# Patient Record
Sex: FEMALE | Race: WHITE | Employment: OTHER | URBAN - METROPOLITAN AREA
[De-identification: names, ages, dates, MRNs, and addresses within clinical notes are randomized per-mention and may not be internally consistent; named-entity substitution may affect disease eponyms.]

---

## 2017-03-07 ENCOUNTER — OFFICE VISIT (OUTPATIENT)
Dept: FAMILY MEDICINE CLINIC | Age: 72
End: 2017-03-07

## 2017-03-07 VITALS
SYSTOLIC BLOOD PRESSURE: 124 MMHG | BODY MASS INDEX: 33.13 KG/M2 | DIASTOLIC BLOOD PRESSURE: 80 MMHG | TEMPERATURE: 98.6 F | HEART RATE: 79 BPM | WEIGHT: 180 LBS | OXYGEN SATURATION: 96 % | RESPIRATION RATE: 16 BRPM | HEIGHT: 62 IN

## 2017-03-07 DIAGNOSIS — E78.2 MIXED HYPERLIPIDEMIA: ICD-10-CM

## 2017-03-07 DIAGNOSIS — R73.9 HYPERGLYCEMIA: Primary | ICD-10-CM

## 2017-03-07 DIAGNOSIS — E55.9 VITAMIN D DEFICIENCY: ICD-10-CM

## 2017-03-07 DIAGNOSIS — I10 ESSENTIAL HYPERTENSION: ICD-10-CM

## 2017-03-07 LAB
A/G RATIO: 1.6 (ref 1.1–2.2)
ALBUMIN SERPL-MCNC: 4.7 G/DL (ref 3.4–5)
ALP BLD-CCNC: 100 U/L (ref 40–129)
ALT SERPL-CCNC: 22 U/L (ref 10–40)
ANION GAP SERPL CALCULATED.3IONS-SCNC: 18 MMOL/L (ref 3–16)
AST SERPL-CCNC: 22 U/L (ref 15–37)
BILIRUB SERPL-MCNC: <0.2 MG/DL (ref 0–1)
BUN BLDV-MCNC: 14 MG/DL (ref 7–20)
CALCIUM SERPL-MCNC: 10.2 MG/DL (ref 8.3–10.6)
CHLORIDE BLD-SCNC: 102 MMOL/L (ref 99–110)
CHOLESTEROL, TOTAL: 177 MG/DL (ref 0–199)
CO2: 23 MMOL/L (ref 21–32)
CREAT SERPL-MCNC: 0.8 MG/DL (ref 0.6–1.2)
GFR AFRICAN AMERICAN: >60
GFR NON-AFRICAN AMERICAN: >60
GLOBULIN: 3 G/DL
GLUCOSE BLD-MCNC: 114 MG/DL (ref 70–99)
HBA1C MFR BLD: 6.4 %
HDLC SERPL-MCNC: 50 MG/DL (ref 40–60)
LDL CHOLESTEROL CALCULATED: 97 MG/DL
POTASSIUM SERPL-SCNC: 4.8 MMOL/L (ref 3.5–5.1)
SODIUM BLD-SCNC: 143 MMOL/L (ref 136–145)
TOTAL PROTEIN: 7.7 G/DL (ref 6.4–8.2)
TRIGL SERPL-MCNC: 148 MG/DL (ref 0–150)
VITAMIN D 25-HYDROXY: 38.1 NG/ML
VLDLC SERPL CALC-MCNC: 30 MG/DL

## 2017-03-07 PROCEDURE — 36415 COLL VENOUS BLD VENIPUNCTURE: CPT | Performed by: FAMILY MEDICINE

## 2017-03-07 PROCEDURE — 3014F SCREEN MAMMO DOC REV: CPT | Performed by: FAMILY MEDICINE

## 2017-03-07 PROCEDURE — G8419 CALC BMI OUT NRM PARAM NOF/U: HCPCS | Performed by: FAMILY MEDICINE

## 2017-03-07 PROCEDURE — 83036 HEMOGLOBIN GLYCOSYLATED A1C: CPT | Performed by: FAMILY MEDICINE

## 2017-03-07 PROCEDURE — G8399 PT W/DXA RESULTS DOCUMENT: HCPCS | Performed by: FAMILY MEDICINE

## 2017-03-07 PROCEDURE — 4040F PNEUMOC VAC/ADMIN/RCVD: CPT | Performed by: FAMILY MEDICINE

## 2017-03-07 PROCEDURE — G8484 FLU IMMUNIZE NO ADMIN: HCPCS | Performed by: FAMILY MEDICINE

## 2017-03-07 PROCEDURE — 1090F PRES/ABSN URINE INCON ASSESS: CPT | Performed by: FAMILY MEDICINE

## 2017-03-07 PROCEDURE — G8427 DOCREV CUR MEDS BY ELIG CLIN: HCPCS | Performed by: FAMILY MEDICINE

## 2017-03-07 PROCEDURE — 1123F ACP DISCUSS/DSCN MKR DOCD: CPT | Performed by: FAMILY MEDICINE

## 2017-03-07 PROCEDURE — 1036F TOBACCO NON-USER: CPT | Performed by: FAMILY MEDICINE

## 2017-03-07 PROCEDURE — 99214 OFFICE O/P EST MOD 30 MIN: CPT | Performed by: FAMILY MEDICINE

## 2017-03-07 PROCEDURE — 3017F COLORECTAL CA SCREEN DOC REV: CPT | Performed by: FAMILY MEDICINE

## 2017-03-07 RX ORDER — METOPROLOL TARTRATE 50 MG/1
TABLET, FILM COATED ORAL
Qty: 180 TABLET | Refills: 1 | Status: CANCELLED | OUTPATIENT
Start: 2017-03-07

## 2017-03-07 RX ORDER — PRAVASTATIN SODIUM 40 MG
TABLET ORAL
Qty: 90 TABLET | Refills: 1 | Status: CANCELLED | OUTPATIENT
Start: 2017-03-07

## 2017-03-07 RX ORDER — PRAVASTATIN SODIUM 40 MG
TABLET ORAL
Qty: 90 TABLET | Refills: 1 | Status: SHIPPED | OUTPATIENT
Start: 2017-03-07 | End: 2017-09-03 | Stop reason: SDUPTHER

## 2017-03-07 RX ORDER — METOPROLOL TARTRATE 50 MG/1
TABLET, FILM COATED ORAL
Qty: 180 TABLET | Refills: 1 | Status: SHIPPED | OUTPATIENT
Start: 2017-03-07 | End: 2017-09-03 | Stop reason: SDUPTHER

## 2017-03-07 ASSESSMENT — ENCOUNTER SYMPTOMS
COUGH: 1
GASTROINTESTINAL NEGATIVE: 1
EYES NEGATIVE: 1

## 2017-09-03 DIAGNOSIS — E78.2 MIXED HYPERLIPIDEMIA: ICD-10-CM

## 2017-09-03 DIAGNOSIS — I10 ESSENTIAL HYPERTENSION: ICD-10-CM

## 2017-09-05 RX ORDER — METOPROLOL TARTRATE 50 MG/1
TABLET, FILM COATED ORAL
Qty: 180 TABLET | Refills: 1 | Status: SHIPPED | OUTPATIENT
Start: 2017-09-05 | End: 2017-09-28 | Stop reason: SDUPTHER

## 2017-09-05 RX ORDER — PRAVASTATIN SODIUM 40 MG
TABLET ORAL
Qty: 90 TABLET | Refills: 1 | Status: SHIPPED | OUTPATIENT
Start: 2017-09-05 | End: 2017-09-28 | Stop reason: SDUPTHER

## 2017-09-28 ENCOUNTER — OFFICE VISIT (OUTPATIENT)
Dept: FAMILY MEDICINE CLINIC | Age: 72
End: 2017-09-28

## 2017-09-28 VITALS
SYSTOLIC BLOOD PRESSURE: 160 MMHG | WEIGHT: 179 LBS | OXYGEN SATURATION: 98 % | HEART RATE: 79 BPM | HEIGHT: 62 IN | RESPIRATION RATE: 16 BRPM | DIASTOLIC BLOOD PRESSURE: 90 MMHG | TEMPERATURE: 98.4 F | BODY MASS INDEX: 32.94 KG/M2

## 2017-09-28 DIAGNOSIS — I10 ESSENTIAL HYPERTENSION: ICD-10-CM

## 2017-09-28 DIAGNOSIS — E78.2 MIXED HYPERLIPIDEMIA: ICD-10-CM

## 2017-09-28 DIAGNOSIS — E55.9 VITAMIN D DEFICIENCY: ICD-10-CM

## 2017-09-28 DIAGNOSIS — G50.0 TRIGEMINAL NEURALGIA: Primary | ICD-10-CM

## 2017-09-28 LAB
A/G RATIO: 1.6 (ref 1.1–2.2)
ALBUMIN SERPL-MCNC: 4.7 G/DL (ref 3.4–5)
ALP BLD-CCNC: 84 U/L (ref 40–129)
ALT SERPL-CCNC: 22 U/L (ref 10–40)
ANION GAP SERPL CALCULATED.3IONS-SCNC: 13 MMOL/L (ref 3–16)
AST SERPL-CCNC: 19 U/L (ref 15–37)
BILIRUB SERPL-MCNC: 0.3 MG/DL (ref 0–1)
BUN BLDV-MCNC: 19 MG/DL (ref 7–20)
CALCIUM SERPL-MCNC: 9.8 MG/DL (ref 8.3–10.6)
CHLORIDE BLD-SCNC: 103 MMOL/L (ref 99–110)
CHOLESTEROL, TOTAL: 173 MG/DL (ref 0–199)
CO2: 26 MMOL/L (ref 21–32)
CREAT SERPL-MCNC: 0.8 MG/DL (ref 0.6–1.2)
GFR AFRICAN AMERICAN: >60
GFR NON-AFRICAN AMERICAN: >60
GLOBULIN: 3 G/DL
GLUCOSE BLD-MCNC: 104 MG/DL (ref 70–99)
HDLC SERPL-MCNC: 45 MG/DL (ref 40–60)
LDL CHOLESTEROL CALCULATED: 92 MG/DL
POTASSIUM SERPL-SCNC: 4.8 MMOL/L (ref 3.5–5.1)
SODIUM BLD-SCNC: 142 MMOL/L (ref 136–145)
TOTAL PROTEIN: 7.7 G/DL (ref 6.4–8.2)
TRIGL SERPL-MCNC: 178 MG/DL (ref 0–150)
VITAMIN D 25-HYDROXY: 38.4 NG/ML
VLDLC SERPL CALC-MCNC: 36 MG/DL

## 2017-09-28 PROCEDURE — 3014F SCREEN MAMMO DOC REV: CPT | Performed by: FAMILY MEDICINE

## 2017-09-28 PROCEDURE — 1090F PRES/ABSN URINE INCON ASSESS: CPT | Performed by: FAMILY MEDICINE

## 2017-09-28 PROCEDURE — 3017F COLORECTAL CA SCREEN DOC REV: CPT | Performed by: FAMILY MEDICINE

## 2017-09-28 PROCEDURE — 1036F TOBACCO NON-USER: CPT | Performed by: FAMILY MEDICINE

## 2017-09-28 PROCEDURE — G8427 DOCREV CUR MEDS BY ELIG CLIN: HCPCS | Performed by: FAMILY MEDICINE

## 2017-09-28 PROCEDURE — 36415 COLL VENOUS BLD VENIPUNCTURE: CPT | Performed by: FAMILY MEDICINE

## 2017-09-28 PROCEDURE — 1123F ACP DISCUSS/DSCN MKR DOCD: CPT | Performed by: FAMILY MEDICINE

## 2017-09-28 PROCEDURE — 4040F PNEUMOC VAC/ADMIN/RCVD: CPT | Performed by: FAMILY MEDICINE

## 2017-09-28 PROCEDURE — G8399 PT W/DXA RESULTS DOCUMENT: HCPCS | Performed by: FAMILY MEDICINE

## 2017-09-28 PROCEDURE — 99214 OFFICE O/P EST MOD 30 MIN: CPT | Performed by: FAMILY MEDICINE

## 2017-09-28 PROCEDURE — G8419 CALC BMI OUT NRM PARAM NOF/U: HCPCS | Performed by: FAMILY MEDICINE

## 2017-09-28 RX ORDER — HYDROCHLOROTHIAZIDE 25 MG/1
25 TABLET ORAL DAILY
Qty: 30 TABLET | Refills: 5 | Status: SHIPPED | OUTPATIENT
Start: 2017-09-28 | End: 2018-05-09 | Stop reason: SDUPTHER

## 2017-09-28 RX ORDER — PRAVASTATIN SODIUM 40 MG
TABLET ORAL
Qty: 90 TABLET | Refills: 2 | Status: SHIPPED | OUTPATIENT
Start: 2017-09-28 | End: 2018-05-22 | Stop reason: SDUPTHER

## 2017-09-28 RX ORDER — NAPROXEN SODIUM 220 MG
220 TABLET ORAL 2 TIMES DAILY WITH MEALS
COMMUNITY

## 2017-09-28 RX ORDER — MECLIZINE HYDROCHLORIDE 25 MG/1
25 TABLET ORAL 3 TIMES DAILY PRN
Qty: 90 TABLET | Refills: 2 | Status: SHIPPED | OUTPATIENT
Start: 2017-09-28 | End: 2021-11-22 | Stop reason: SDUPTHER

## 2017-09-28 RX ORDER — ACETAMINOPHEN 650 MG/1
650 SUPPOSITORY RECTAL EVERY 4 HOURS PRN
COMMUNITY

## 2017-09-28 RX ORDER — METOPROLOL TARTRATE 50 MG/1
TABLET, FILM COATED ORAL
Qty: 180 TABLET | Refills: 2 | Status: SHIPPED | OUTPATIENT
Start: 2017-09-28 | End: 2018-05-22 | Stop reason: SDUPTHER

## 2018-05-09 PROBLEM — I16.0 HYPERTENSIVE URGENCY: Chronic | Status: ACTIVE | Noted: 2018-05-09

## 2018-05-09 PROBLEM — I16.0 HYPERTENSIVE URGENCY: Status: ACTIVE | Noted: 2018-05-09

## 2018-05-09 PROBLEM — R42 VERTIGO: Status: ACTIVE | Noted: 2018-05-09

## 2018-05-22 ENCOUNTER — OFFICE VISIT (OUTPATIENT)
Dept: FAMILY MEDICINE CLINIC | Age: 73
End: 2018-05-22

## 2018-05-22 VITALS
DIASTOLIC BLOOD PRESSURE: 92 MMHG | WEIGHT: 186 LBS | HEART RATE: 88 BPM | TEMPERATURE: 98.2 F | BODY MASS INDEX: 34.02 KG/M2 | SYSTOLIC BLOOD PRESSURE: 156 MMHG

## 2018-05-22 DIAGNOSIS — G50.0 TRIGEMINAL NEURALGIA OF LEFT SIDE OF FACE: Primary | ICD-10-CM

## 2018-05-22 DIAGNOSIS — E78.2 MIXED HYPERLIPIDEMIA: ICD-10-CM

## 2018-05-22 DIAGNOSIS — I10 ESSENTIAL HYPERTENSION: ICD-10-CM

## 2018-05-22 DIAGNOSIS — R73.03 PREDIABETES: ICD-10-CM

## 2018-05-22 PROCEDURE — G8417 CALC BMI ABV UP PARAM F/U: HCPCS | Performed by: NURSE PRACTITIONER

## 2018-05-22 PROCEDURE — 99214 OFFICE O/P EST MOD 30 MIN: CPT | Performed by: NURSE PRACTITIONER

## 2018-05-22 PROCEDURE — G8399 PT W/DXA RESULTS DOCUMENT: HCPCS | Performed by: NURSE PRACTITIONER

## 2018-05-22 PROCEDURE — G8427 DOCREV CUR MEDS BY ELIG CLIN: HCPCS | Performed by: NURSE PRACTITIONER

## 2018-05-22 PROCEDURE — 1036F TOBACCO NON-USER: CPT | Performed by: NURSE PRACTITIONER

## 2018-05-22 PROCEDURE — 3017F COLORECTAL CA SCREEN DOC REV: CPT | Performed by: NURSE PRACTITIONER

## 2018-05-22 PROCEDURE — 1123F ACP DISCUSS/DSCN MKR DOCD: CPT | Performed by: NURSE PRACTITIONER

## 2018-05-22 PROCEDURE — 1090F PRES/ABSN URINE INCON ASSESS: CPT | Performed by: NURSE PRACTITIONER

## 2018-05-22 PROCEDURE — 4040F PNEUMOC VAC/ADMIN/RCVD: CPT | Performed by: NURSE PRACTITIONER

## 2018-05-22 RX ORDER — PRAVASTATIN SODIUM 40 MG
TABLET ORAL
Qty: 90 TABLET | Refills: 2 | Status: SHIPPED | OUTPATIENT
Start: 2018-05-22 | End: 2019-03-11 | Stop reason: SDUPTHER

## 2018-05-22 RX ORDER — METOPROLOL TARTRATE 50 MG/1
TABLET, FILM COATED ORAL
Qty: 180 TABLET | Refills: 2 | Status: SHIPPED | OUTPATIENT
Start: 2018-05-22 | End: 2019-03-11 | Stop reason: SDUPTHER

## 2018-05-22 RX ORDER — HYDROCHLOROTHIAZIDE 12.5 MG/1
25 TABLET ORAL DAILY
Qty: 30 TABLET | Refills: 1 | Status: SHIPPED | OUTPATIENT
Start: 2018-05-22 | End: 2018-05-22 | Stop reason: DRUGHIGH

## 2018-08-27 ENCOUNTER — OFFICE VISIT (OUTPATIENT)
Dept: FAMILY MEDICINE CLINIC | Age: 73
End: 2018-08-27

## 2018-08-27 VITALS
BODY MASS INDEX: 33.65 KG/M2 | WEIGHT: 184 LBS | HEART RATE: 67 BPM | DIASTOLIC BLOOD PRESSURE: 80 MMHG | SYSTOLIC BLOOD PRESSURE: 138 MMHG | OXYGEN SATURATION: 97 %

## 2018-08-27 DIAGNOSIS — E78.49 OTHER HYPERLIPIDEMIA: Chronic | ICD-10-CM

## 2018-08-27 DIAGNOSIS — Z12.39 BREAST CANCER SCREENING: ICD-10-CM

## 2018-08-27 DIAGNOSIS — R73.03 PRE-DIABETES: Primary | ICD-10-CM

## 2018-08-27 DIAGNOSIS — I47.1 PAROXYSMAL SUPRAVENTRICULAR TACHYCARDIA (HCC): Chronic | ICD-10-CM

## 2018-08-27 DIAGNOSIS — I10 ESSENTIAL HYPERTENSION: Chronic | ICD-10-CM

## 2018-08-27 DIAGNOSIS — M85.80 OSTEOPENIA, UNSPECIFIED LOCATION: ICD-10-CM

## 2018-08-27 PROCEDURE — 1101F PT FALLS ASSESS-DOCD LE1/YR: CPT | Performed by: FAMILY MEDICINE

## 2018-08-27 PROCEDURE — 1123F ACP DISCUSS/DSCN MKR DOCD: CPT | Performed by: FAMILY MEDICINE

## 2018-08-27 PROCEDURE — 36415 COLL VENOUS BLD VENIPUNCTURE: CPT | Performed by: FAMILY MEDICINE

## 2018-08-27 PROCEDURE — 3017F COLORECTAL CA SCREEN DOC REV: CPT | Performed by: FAMILY MEDICINE

## 2018-08-27 PROCEDURE — 1036F TOBACCO NON-USER: CPT | Performed by: FAMILY MEDICINE

## 2018-08-27 PROCEDURE — 4040F PNEUMOC VAC/ADMIN/RCVD: CPT | Performed by: FAMILY MEDICINE

## 2018-08-27 PROCEDURE — G8427 DOCREV CUR MEDS BY ELIG CLIN: HCPCS | Performed by: FAMILY MEDICINE

## 2018-08-27 PROCEDURE — G8417 CALC BMI ABV UP PARAM F/U: HCPCS | Performed by: FAMILY MEDICINE

## 2018-08-27 PROCEDURE — G8399 PT W/DXA RESULTS DOCUMENT: HCPCS | Performed by: FAMILY MEDICINE

## 2018-08-27 PROCEDURE — 1090F PRES/ABSN URINE INCON ASSESS: CPT | Performed by: FAMILY MEDICINE

## 2018-08-27 PROCEDURE — 99214 OFFICE O/P EST MOD 30 MIN: CPT | Performed by: FAMILY MEDICINE

## 2018-08-27 RX ORDER — ACETAMINOPHEN 325 MG/1
650 TABLET ORAL EVERY 6 HOURS PRN
COMMUNITY
End: 2019-03-11

## 2018-08-27 ASSESSMENT — PATIENT HEALTH QUESTIONNAIRE - PHQ9
SUM OF ALL RESPONSES TO PHQ9 QUESTIONS 1 & 2: 0
SUM OF ALL RESPONSES TO PHQ QUESTIONS 1-9: 0
2. FEELING DOWN, DEPRESSED OR HOPELESS: 0
SUM OF ALL RESPONSES TO PHQ QUESTIONS 1-9: 0
1. LITTLE INTEREST OR PLEASURE IN DOING THINGS: 0

## 2018-08-28 LAB
ESTIMATED AVERAGE GLUCOSE: 134.1 MG/DL
HBA1C MFR BLD: 6.3 %

## 2018-09-09 ASSESSMENT — ENCOUNTER SYMPTOMS: SHORTNESS OF BREATH: 0

## 2019-01-10 ENCOUNTER — HOSPITAL ENCOUNTER (OUTPATIENT)
Dept: WOMENS IMAGING | Age: 74
Discharge: HOME | End: 2019-01-10
Payer: COMMERCIAL

## 2019-01-10 DIAGNOSIS — Z12.31 VISIT FOR SCREENING MAMMOGRAM: ICD-10-CM

## 2019-01-10 PROCEDURE — 77067 SCR MAMMO BI INCL CAD: CPT

## 2019-03-11 ENCOUNTER — OFFICE VISIT (OUTPATIENT)
Dept: FAMILY MEDICINE CLINIC | Age: 74
End: 2019-03-11
Payer: COMMERCIAL

## 2019-03-11 VITALS
SYSTOLIC BLOOD PRESSURE: 130 MMHG | TEMPERATURE: 98.1 F | BODY MASS INDEX: 34.02 KG/M2 | HEART RATE: 72 BPM | WEIGHT: 186 LBS | DIASTOLIC BLOOD PRESSURE: 72 MMHG

## 2019-03-11 DIAGNOSIS — E78.49 OTHER HYPERLIPIDEMIA: ICD-10-CM

## 2019-03-11 DIAGNOSIS — M79.645 PAIN OF LEFT MIDDLE FINGER: ICD-10-CM

## 2019-03-11 DIAGNOSIS — I10 ESSENTIAL HYPERTENSION: ICD-10-CM

## 2019-03-11 DIAGNOSIS — I47.1 PAROXYSMAL SUPRAVENTRICULAR TACHYCARDIA (HCC): ICD-10-CM

## 2019-03-11 DIAGNOSIS — R73.03 PRE-DIABETES: ICD-10-CM

## 2019-03-11 DIAGNOSIS — J44.9 CHRONIC OBSTRUCTIVE PULMONARY DISEASE, UNSPECIFIED COPD TYPE (HCC): ICD-10-CM

## 2019-03-11 DIAGNOSIS — E78.2 MIXED HYPERLIPIDEMIA: Primary | ICD-10-CM

## 2019-03-11 PROCEDURE — 4040F PNEUMOC VAC/ADMIN/RCVD: CPT | Performed by: FAMILY MEDICINE

## 2019-03-11 PROCEDURE — 3023F SPIROM DOC REV: CPT | Performed by: FAMILY MEDICINE

## 2019-03-11 PROCEDURE — 99214 OFFICE O/P EST MOD 30 MIN: CPT | Performed by: FAMILY MEDICINE

## 2019-03-11 PROCEDURE — G8484 FLU IMMUNIZE NO ADMIN: HCPCS | Performed by: FAMILY MEDICINE

## 2019-03-11 PROCEDURE — 3017F COLORECTAL CA SCREEN DOC REV: CPT | Performed by: FAMILY MEDICINE

## 2019-03-11 PROCEDURE — G8417 CALC BMI ABV UP PARAM F/U: HCPCS | Performed by: FAMILY MEDICINE

## 2019-03-11 PROCEDURE — G8399 PT W/DXA RESULTS DOCUMENT: HCPCS | Performed by: FAMILY MEDICINE

## 2019-03-11 PROCEDURE — 1123F ACP DISCUSS/DSCN MKR DOCD: CPT | Performed by: FAMILY MEDICINE

## 2019-03-11 PROCEDURE — 1101F PT FALLS ASSESS-DOCD LE1/YR: CPT | Performed by: FAMILY MEDICINE

## 2019-03-11 PROCEDURE — G8926 SPIRO NO PERF OR DOC: HCPCS | Performed by: FAMILY MEDICINE

## 2019-03-11 PROCEDURE — 1090F PRES/ABSN URINE INCON ASSESS: CPT | Performed by: FAMILY MEDICINE

## 2019-03-11 PROCEDURE — 1036F TOBACCO NON-USER: CPT | Performed by: FAMILY MEDICINE

## 2019-03-11 PROCEDURE — G8427 DOCREV CUR MEDS BY ELIG CLIN: HCPCS | Performed by: FAMILY MEDICINE

## 2019-03-11 RX ORDER — METOPROLOL TARTRATE 50 MG/1
TABLET, FILM COATED ORAL
Qty: 180 TABLET | Refills: 1 | Status: SHIPPED | OUTPATIENT
Start: 2019-03-11 | End: 2019-09-19 | Stop reason: SDUPTHER

## 2019-03-11 RX ORDER — PRAVASTATIN SODIUM 40 MG
TABLET ORAL
Qty: 90 TABLET | Refills: 1 | Status: SHIPPED | OUTPATIENT
Start: 2019-03-11 | End: 2019-09-19 | Stop reason: SDUPTHER

## 2019-03-11 ASSESSMENT — PATIENT HEALTH QUESTIONNAIRE - PHQ9
SUM OF ALL RESPONSES TO PHQ9 QUESTIONS 1 & 2: 0
2. FEELING DOWN, DEPRESSED OR HOPELESS: 0
SUM OF ALL RESPONSES TO PHQ QUESTIONS 1-9: 0
SUM OF ALL RESPONSES TO PHQ QUESTIONS 1-9: 0
1. LITTLE INTEREST OR PLEASURE IN DOING THINGS: 0

## 2019-03-11 ASSESSMENT — ENCOUNTER SYMPTOMS: SHORTNESS OF BREATH: 0

## 2019-03-12 LAB
A/G RATIO: 1.7 (ref 1.1–2.2)
ALBUMIN SERPL-MCNC: 5 G/DL (ref 3.4–5)
ALP BLD-CCNC: 77 U/L (ref 40–129)
ALT SERPL-CCNC: 27 U/L (ref 10–40)
ANION GAP SERPL CALCULATED.3IONS-SCNC: 15 MMOL/L (ref 3–16)
AST SERPL-CCNC: 21 U/L (ref 15–37)
BILIRUB SERPL-MCNC: 0.3 MG/DL (ref 0–1)
BUN BLDV-MCNC: 19 MG/DL (ref 7–20)
CALCIUM SERPL-MCNC: 10.2 MG/DL (ref 8.3–10.6)
CHLORIDE BLD-SCNC: 103 MMOL/L (ref 99–110)
CHOLESTEROL, TOTAL: 152 MG/DL (ref 0–199)
CO2: 24 MMOL/L (ref 21–32)
CREAT SERPL-MCNC: 0.8 MG/DL (ref 0.6–1.2)
ESTIMATED AVERAGE GLUCOSE: 131.2 MG/DL
GFR AFRICAN AMERICAN: >60
GFR NON-AFRICAN AMERICAN: >60
GLOBULIN: 2.9 G/DL
GLUCOSE BLD-MCNC: 109 MG/DL (ref 70–99)
HBA1C MFR BLD: 6.2 %
HDLC SERPL-MCNC: 46 MG/DL (ref 40–60)
LDL CHOLESTEROL CALCULATED: 71 MG/DL
POTASSIUM SERPL-SCNC: 4.8 MMOL/L (ref 3.5–5.1)
SODIUM BLD-SCNC: 142 MMOL/L (ref 136–145)
TOTAL PROTEIN: 7.9 G/DL (ref 6.4–8.2)
TRIGL SERPL-MCNC: 174 MG/DL (ref 0–150)
VLDLC SERPL CALC-MCNC: 35 MG/DL

## 2019-03-18 ENCOUNTER — OFFICE VISIT (OUTPATIENT)
Dept: ORTHOPEDIC SURGERY | Age: 74
End: 2019-03-18
Payer: COMMERCIAL

## 2019-03-18 VITALS — WEIGHT: 186.07 LBS | HEIGHT: 62 IN | RESPIRATION RATE: 11 BRPM | BODY MASS INDEX: 34.24 KG/M2

## 2019-03-18 DIAGNOSIS — M65.332 TRIGGER FINGER, LEFT MIDDLE FINGER: ICD-10-CM

## 2019-03-18 DIAGNOSIS — R20.0 NUMBNESS IN BOTH HANDS: ICD-10-CM

## 2019-03-18 PROCEDURE — 1123F ACP DISCUSS/DSCN MKR DOCD: CPT | Performed by: ORTHOPAEDIC SURGERY

## 2019-03-18 PROCEDURE — G8417 CALC BMI ABV UP PARAM F/U: HCPCS | Performed by: ORTHOPAEDIC SURGERY

## 2019-03-18 PROCEDURE — 4040F PNEUMOC VAC/ADMIN/RCVD: CPT | Performed by: ORTHOPAEDIC SURGERY

## 2019-03-18 PROCEDURE — 3017F COLORECTAL CA SCREEN DOC REV: CPT | Performed by: ORTHOPAEDIC SURGERY

## 2019-03-18 PROCEDURE — 1036F TOBACCO NON-USER: CPT | Performed by: ORTHOPAEDIC SURGERY

## 2019-03-18 PROCEDURE — G8484 FLU IMMUNIZE NO ADMIN: HCPCS | Performed by: ORTHOPAEDIC SURGERY

## 2019-03-18 PROCEDURE — 1090F PRES/ABSN URINE INCON ASSESS: CPT | Performed by: ORTHOPAEDIC SURGERY

## 2019-03-18 PROCEDURE — 1101F PT FALLS ASSESS-DOCD LE1/YR: CPT | Performed by: ORTHOPAEDIC SURGERY

## 2019-03-18 PROCEDURE — 99203 OFFICE O/P NEW LOW 30 MIN: CPT | Performed by: ORTHOPAEDIC SURGERY

## 2019-03-18 PROCEDURE — G8399 PT W/DXA RESULTS DOCUMENT: HCPCS | Performed by: ORTHOPAEDIC SURGERY

## 2019-03-18 PROCEDURE — G8427 DOCREV CUR MEDS BY ELIG CLIN: HCPCS | Performed by: ORTHOPAEDIC SURGERY

## 2019-04-05 ENCOUNTER — OFFICE VISIT (OUTPATIENT)
Dept: ORTHOPEDIC SURGERY | Age: 74
End: 2019-04-05
Payer: COMMERCIAL

## 2019-04-05 DIAGNOSIS — G56.03 BILATERAL CARPAL TUNNEL SYNDROME: Primary | ICD-10-CM

## 2019-04-05 PROCEDURE — 95886 MUSC TEST DONE W/N TEST COMP: CPT | Performed by: PHYSICAL MEDICINE & REHABILITATION

## 2019-04-05 PROCEDURE — 95910 NRV CNDJ TEST 7-8 STUDIES: CPT | Performed by: PHYSICAL MEDICINE & REHABILITATION

## 2019-04-05 PROCEDURE — 99999 PR OFFICE/OUTPT VISIT,PROCEDURE ONLY: CPT | Performed by: PHYSICAL MEDICINE & REHABILITATION

## 2019-04-05 NOTE — PROGRESS NOTES
[address] MEDICINE      Patient: Mariluz Pires Age: 76 Years 0 Months  Sex: Female Date: 4/5/2019  YOB: 1945 Ref. Phys.: Dr George Winkler  Notes:  b/l hand numbness; r/o CTS      Sensory NCS      Nerve / Sites Peak PeakAmp Dist Agustin    ms µV cm m/s   L MEDIAN - D2 ULNAR D5   1. Median Wrist 5.25 6.8 14 33.3   2. Ulnar Wrist 3.30 12.7 14 50.9   R MEDIAN - D2 ULNAR D5   1. Median Wrist NR  14    2. Ulnar Wrist 3.30 21.1 14 53.8       R MEDIAN - D2 ULNAR D5: no right median SNAP      Motor NCS      Nerve / Sites Lat Amp Amp Dist Agustin    ms mV % cm m/s   L MEDIAN - APB   1. Wrist 5.30 8.3 100 8    2. Elbow 9.30 7.8 94.2 22 55.0   R MEDIAN - APB   1. Wrist 6.50 0.1 100     L ULNAR - ADM   1. Wrist 2.75 10.2 100 8    2. B. Elbow 5.85 10.7 105 19 61.3   3. A. Elbow 7.80 10.6 104 10 51.3   R ULNAR - ADM   1. Wrist 3.10 9.2 100 8    2. B. Elbow 6.10 9.9 107 19 63.3   3. A. Elbow 8.10 9.8 106 10 50.0       EMG Summary Table     Spontaneous MUAP Recruit. Ins. Act Fibs. PSW Fasics. H.F. Amp. Dur. Poly's. Pattern   L. FIRST D INTEROSS N None None None None N N N N   L. ABD POLL BREVIS N None None None None ++ + N N   R. ABD POLL BREVIS N None None None None ++ + N -   L. BICEPS N None None None None N N N N   R. BICEPS N None None None None N N N N   L. TRICEPS N None None None None N N N N   R. TRICEPS N None None None None N N N N   L. EXT CARPI R BREV N None None None None N N N N   R. EXT CARPI R BREV N None None None None N N N N   L. EXT DIG COMM N None None None None N N N N   R. EXT DIG COMM N None None None None N N N N   L. PRON TERES N None None None None N N N N   R. PRON TERES N None None None None N N N N       Summary: Right median SNAP is absent. Left median SNAP and bilateral median CMAP latencies are slowed with low left sensory and right motor amplitudes.   Monopolar exam reveals large amplitude increased duration motor units to bilateral APB

## 2019-04-08 ENCOUNTER — OFFICE VISIT (OUTPATIENT)
Dept: ORTHOPEDIC SURGERY | Age: 74
End: 2019-04-08
Payer: COMMERCIAL

## 2019-04-08 VITALS — WEIGHT: 186.07 LBS | HEIGHT: 62 IN | BODY MASS INDEX: 34.24 KG/M2 | RESPIRATION RATE: 17 BRPM

## 2019-04-08 DIAGNOSIS — G56.03 CARPAL TUNNEL SYNDROME ON BOTH SIDES: ICD-10-CM

## 2019-04-08 DIAGNOSIS — M65.332 TRIGGER FINGER, LEFT MIDDLE FINGER: Primary | ICD-10-CM

## 2019-04-08 PROCEDURE — 4040F PNEUMOC VAC/ADMIN/RCVD: CPT | Performed by: ORTHOPAEDIC SURGERY

## 2019-04-08 PROCEDURE — G8417 CALC BMI ABV UP PARAM F/U: HCPCS | Performed by: ORTHOPAEDIC SURGERY

## 2019-04-08 PROCEDURE — 1123F ACP DISCUSS/DSCN MKR DOCD: CPT | Performed by: ORTHOPAEDIC SURGERY

## 2019-04-08 PROCEDURE — 1090F PRES/ABSN URINE INCON ASSESS: CPT | Performed by: ORTHOPAEDIC SURGERY

## 2019-04-08 PROCEDURE — G8427 DOCREV CUR MEDS BY ELIG CLIN: HCPCS | Performed by: ORTHOPAEDIC SURGERY

## 2019-04-08 PROCEDURE — 1036F TOBACCO NON-USER: CPT | Performed by: ORTHOPAEDIC SURGERY

## 2019-04-08 PROCEDURE — G8399 PT W/DXA RESULTS DOCUMENT: HCPCS | Performed by: ORTHOPAEDIC SURGERY

## 2019-04-08 PROCEDURE — 99214 OFFICE O/P EST MOD 30 MIN: CPT | Performed by: ORTHOPAEDIC SURGERY

## 2019-04-08 PROCEDURE — 3017F COLORECTAL CA SCREEN DOC REV: CPT | Performed by: ORTHOPAEDIC SURGERY

## 2019-04-08 NOTE — PROGRESS NOTES
Chief Complaint:  Hand Pain (TR EMG )      History of Present of Illness: The patient returns and did have electrodiagnostic studies performed. It demonstrated fairly advanced bilateral carpal tunnel syndrome with end organ muscle changes into the thenar muscles bilaterally. She continues to have generalized stiffness and triggering to the left long finger. Review of Systems  Pertinent items are noted in HPI  Denies fever, chills, confusion, bowel/bladder active change. Review of systems reviewed from Patient History Form dated on 3/18/2019 and available in the patient's chart under the Media tab. Examination:  On exam today it is more readily apparent that she does have some atrophy along the thenar musculature, especially on the right side. There is grade 2 triggering of the left long finger with generalized stiffness. I do not appreciate any other digits with triggering. She does have at least protective sensation to the digits but there is diminution in the median nerve distribution bilaterally. All fingers have good perfusion. Radiology:     X-rays obtained and reviewed in office:  Views     Location    Impression      No orders of the defined types were placed in this encounter. Impression:  Encounter Diagnoses   Name Primary?  Trigger finger, left middle finger Yes    Carpal tunnel syndrome on both sides          Treatment Plan:  I talked with the patient about the severe and bilateral carpal tunnel syndrome and the left long trigger digit. She is much more bothered on the left side perhaps because of the combination of factors. I have not hesitated in recommending definitive left carpal tunnel release and left long trigger release. We will involve early postop therapy. After she has healed substantially on the left side she does understand she will also then need a right carpal tunnel release.   However, with the staged procedures this will always allow for her to have 1 hand for daily tasks and self-care. We discussed the risks, benefits, limitations, alternatives, and postop recovery following the proposed procedure. We will begin the process of scheduling surgery if there are no other preoperative medical contraindications. Please note that this transcription was created using voice recognition software. Any errors are unintentional and may be due to voice recognition transcription.

## 2019-04-30 NOTE — TELEPHONE ENCOUNTER
Tried calling pt, but phone rings one time and then the line goes dead. Sent pt [employer].

## 2019-05-02 NOTE — TELEPHONE ENCOUNTER
Ellie Bell 1:01 PM   No I am not taking Metformin do not need thank you. Sorry you haven't been able to reach me but I've been having trouble with new phones.

## 2019-09-19 DIAGNOSIS — I10 ESSENTIAL HYPERTENSION: ICD-10-CM

## 2019-09-19 DIAGNOSIS — E78.2 MIXED HYPERLIPIDEMIA: ICD-10-CM

## 2019-09-19 RX ORDER — METOPROLOL TARTRATE 50 MG/1
TABLET, FILM COATED ORAL
Qty: 180 TABLET | Refills: 4 | Status: SHIPPED | OUTPATIENT
Start: 2019-09-19 | End: 2020-11-11 | Stop reason: SDUPTHER

## 2019-09-19 RX ORDER — PRAVASTATIN SODIUM 40 MG
TABLET ORAL
Qty: 90 TABLET | Refills: 4 | Status: SHIPPED | OUTPATIENT
Start: 2019-09-19 | End: 2020-11-11 | Stop reason: SDUPTHER

## 2020-06-30 NOTE — PROGRESS NOTES
Chief Complaint   Patient presents with    New Patient     former [facility] patient         HPI      68 y.o. female presents today to establish care. Previously had dizziness and saw ENT Dr. Eyal Sommer in 2009. Has been taking meclizine which helps with with dizziness. 25mg every 4 hours. Dizzy spell occur twice per month. States that it occurs with position changes and barometric pressure. Hx of trigeminal neuralgia does not want to see neurologitst  Takes 2 aleve 2 days in a row and would be fine. Hasn't taken in at least 2 months. Over the past few years overall her symptoms have been improving. Hx of HLD on pravastatin. Concern about risk for DM related to statin use. Denies any medication SE. Hx of pre diabetes not taking the the metformin. She states it wasn't discussed with her regarding starting it. Hx HTN and SVT. Takes lopressor 50mg BID and HCTZ prn for elevated BP (she was told to take the HCTZ like this by her previous PCP) BP runs 120's 130's at home. BP today 138/80    Patient Active Problem List   Diagnosis    Hypertension    Vitamin D deficiency    HLD (hyperlipidemia)    Paroxysmal supraventricular tachycardia (HCC)    Asthma/COPD    Arthritis    Trigeminal neuralgia    Fatty liver    Hypertensive urgency    Vertigo     Past Medical History:   Diagnosis Date    Arthritis     Asthma     COPD (chronic obstructive pulmonary disease) (HCC)     Fatty liver     Hiatal hernia     HTN (hypertension)     Hyperlipidemia     Osteopenia     Trigeminal neuralgia     Vertigo        Past Surgical History:   Procedure Laterality Date    CHOLECYSTECTOMY      DILATION AND CURETTAGE OF UTERUS      DILATION AND CURETTAGE OF UTERUS      x2    TUBAL LIGATION         There is no immunization history on file for this patient.      Current Outpatient Prescriptions   Medication Sig Dispense Refill    acetaminophen (TYLENOL) 325 MG tablet Take 650 mg by mouth every 6 hours as needed for Pain      pravastatin (PRAVACHOL) 40 MG tablet TAKE 1 TABLET EVERY EVENING 90 tablet 2    metoprolol tartrate (LOPRESSOR) 50 MG tablet TAKE 1 TABLET TWICE A  tablet 2    acetaminophen (TYLENOL) 650 MG suppository Place 650 mg rectally every 4 hours as needed for Fever      naproxen sodium (ALEVE) 220 MG tablet Take 220 mg by mouth 2 times daily (with meals)      meclizine (ANTIVERT) 25 MG tablet Take 1 tablet by mouth 3 times daily as needed (prn) 90 tablet 2    hydrochlorothiazide (MICROZIDE) 12.5 MG capsule Take 1 capsule by mouth daily (Patient taking differently: Take 12.5 mg by mouth daily Daily prn for elevated bp) 30 capsule 3    calcium carbonate (TUMS) 500 MG chewable tablet Take 2 tablets by mouth daily as needed.  therapeutic multivitamin-minerals (THERAGRAN-M) tablet Take 1 tablet by mouth daily.  aspirin 81 MG chewable tablet Take 81 mg by mouth daily. No current facility-administered medications for this visit.       Allergies   Allergen Reactions    Latex     Amlodipine      Red rash all over      Bactrim     Benazepril Hcl     Ciprofloxacin      DIZZY    Clonidine Derivatives      Dropped BP to low      Coreg [Carvedilol]     Dye [Iodides]     Fish-Derived Products     Lisinopril      CAUSED PT FACE TO GET RED AND BURN LIKE FIRE    Lotensin [Benazepril Hcl]      Red rash      Penicillins     Sulfa Antibiotics     Vicodin [Hydrocodone-Acetaminophen]      Dizzy         Social History     Social History    Marital status:      Spouse name: Evelyn Sanches Number of children: 5    Years of education: 15     Occupational History    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     Social History Main Topics    Smoking status: Never Smoker    Smokeless tobacco: Never Used    Alcohol use No    Drug use: No    Sexual activity: Yes     Partners: Male     Other Topics Concern    None     Social History Narrative    None     Family History   Problem Relation Age of Onset    Diabetes Mother     Heart Disease Mother     High Blood Pressure Mother     Kidney Disease Mother     Arthritis Father     High Blood Pressure Father     Cancer Sister     Heart Disease Brother     Heart Disease Maternal Grandmother     Cancer Paternal Aunt     Tuberculosis Maternal Uncle                               Review Of Systems    Review of Systems   Constitutional: Negative for chills and fever. Respiratory: Negative for shortness of breath. Cardiovascular: Negative for chest pain. Psychiatric/Behavioral: Negative for behavioral problems. PHYSICAL EXAMINATION:    /80 (Site: Left Arm, Position: Sitting)   Pulse 67   Wt 184 lb (83.5 kg)   SpO2 97%   BMI 33.65 kg/m²     Physical Exam   Constitutional: She is oriented to person, place, and time. She appears well-developed and well-nourished. No distress. HENT:   Head: Normocephalic and atraumatic. Right Ear: External ear normal.   Left Ear: External ear normal.   Eyes: Conjunctivae and EOM are normal.   Cardiovascular: Normal rate, regular rhythm and normal heart sounds. Pulmonary/Chest: Effort normal and breath sounds normal. No respiratory distress. She has no wheezes. She has no rales. Musculoskeletal: She exhibits no edema. Neurological: She is alert and oriented to person, place, and time. Skin: Skin is warm and dry. She is not diaphoretic. Psychiatric: She has a normal mood and affect. Vitals reviewed. ASSESSMENT:   Well Adult, See encounter diagnoses  Gibson Ramirez was seen today for new patient. Diagnoses and all orders for this visit:    Pre-diabetes  Will repeat a1c and if stable will defer starting metformin  -     Hemoglobin A1C    Breast cancer screening  -     Mammography Screening    Essential hypertension  Continue current regimen. Discussed that hctz is not meant to be taken PRN.  BP at home are stable  Notify me if BP are >150/90  Paroxysmal supraventricular 87 yo M 85 yo M Hx of HTN, HLD, CLL, PPM/AICD 2014 ([place] [facility]), CABGx3 30 yrs ago, CAD w/stent, AVR 10 yrs ago ([facility]), former smoker x35 yrs, presents for preop assessment for flex bronch/EBUS w/Dr [place] on . Today pt reports feeling well and has no complaints. Denies chest pain, dizziness, palpitations, shortness of breath, syncopal episode, fevers, chills, nausea, vomiting, diarrhea, or lower extremity edema.    OPIOID RISK TOOL    THOMAS EACH BOX THAT APPLIES AND ADD TOTALS AT THE END    FAMILY HISTORY OF SUBSTANCE ABUSE                 FEMALE         MALE                                                Alcohol                             [  ]1 pt          [  ]3pts                                               Illegal Durgs                     [  ]2 pts        [  ]3pts                                               Rx Drugs                           [  ]4 pts        [  ]4 pts    PERSONAL HISTORY OF SUBSTANCE ABUSE                                                                                          Alcohol                             [  ]3 pts       [  ]3 pts                                               Illegal Drugs                     [  ]4 pts        [  ]4 pts                                               Rx Drugs                           [  ]5 pts        [  ]5 pts    AGE BETWEEN 16-45 YEARS                                      [  ]1 pt         [  ]1 pt    HISTORY OF PREADOLESCENT   SEXUAL ABUSE                                                             [  ]3 pts        [  ]0pts    PSYCHOLOGICAL DISEASE                     ADD, OCD, Bipolar, Schizophrenia        [  ]2 pts         [  ]2 pts                      Depression                                               [  ]1 pt           [  ]1 pt           SCORING TOTAL   (add numbers and type here)              ( 0 )                                     A score of 3 or lower indicated LOW risk for future opioid abuse  A score of 4 to 7 indicated moderate risk for future opioid abuse  A score of 8 or higher indicates a high risk for opioid abuse    CAPRINI SCORE [CLOT]    AGE RELATED RISK FACTORS                                                       MOBILITY RELATED FACTORS  [ ] Age 41-60 years                                            (1 Point)                  [ ] Bed rest                                                        (1 Point)  [ ] Age: 61-74 years                                           (2 Points)                 [ ] Plaster cast                                                   (2 Points)  [x ] Age= 75 years                                              (3 Points)                 [ ] Bed bound for more than 72 hours                 (2 Points)    DISEASE RELATED RISK FACTORS                                               GENDER SPECIFIC FACTORS  [ ] Edema in the lower extremities                       (1 Point)                  [ ] Pregnancy                                                     (1 Point)  [ ] Varicose veins                                               (1 Point)                  [ ] Post-partum < 6 weeks                                   (1 Point)             [x ] BMI > 25 Kg/m2                                            (1 Point)                  [ ] Hormonal therapy  or oral contraception          (1 Point)                 [ ] Sepsis (in the previous month)                        (1 Point)                  [ ] History of pregnancy complications                 (1 point)  [ ] Pneumonia or serious lung disease                                               [ ] Unexplained or recurrent                     (1 Point)           (in the previous month)                               (1 Point)  [ ] Abnormal pulmonary function test                     (1 Point)                 SURGERY RELATED RISK FACTORS  [ ] Acute myocardial infarction                              (1 Point)                 [ ]  Section                                             (1 Point)  [ ] Congestive heart failure (in the previous month)  (1 Point)               [x ] Minor surgery                                                  (1 Point)   [ ] Inflammatory bowel disease                             (1 Point)                 [ ] Arthroscopic surgery                                        (2 Points)  [ ] Central venous access                                      (2 Points)                [ ] General surgery lasting more than 45 minutes   (2 Points)       [ ] Stroke (in the previous month)                          (5 Points)               [ ] Elective arthroplasty                                         (5 Points)                                                                                                                                               HEMATOLOGY RELATED FACTORS                                                 TRAUMA RELATED RISK FACTORS  [ ] Prior episodes of VTE                                     (3 Points)                [ ] Fracture of the hip, pelvis, or leg                       (5 Points)  [ ] Positive family history for VTE                         (3 Points)                 [ ] Acute spinal cord injury (in the previous month)  (5 Points)  [ ] Prothrombin 21727 A                                     (3 Points)                 [ ] Paralysis  (less than 1 month)                             (5 Points)  [ ] Factor V Leiden                                             (3 Points)                  [ ] Multiple Trauma within 1 month                        (5 Points)  [ ] Lupus anticoagulants                                     (3 Points)                                                           [ ] Anticardiolipin antibodies                               (3 Points)                                                       [ ] High homocysteine in the blood                      (3 Points)                                             [ ] Other congenital or acquired thrombophilia      (3 Points)                                                [ ] Heparin induced thrombocytopenia                  (3 Points)                                          Total Score [    4      ]    Caprini Score 0 - 2:  Low Risk, No VTE Prophylaxis required for most patients, encourage ambulation  Caprini Score 3 - 6:  At Risk, pharmacologic VTE prophylaxis is indicated for most patients (in the absence of a contraindication)  Caprini Score Greater than or = 7:  High Risk, pharmacologic VTE prophylaxis is indicated for most patients (in the absence of a contraindication)

## 2020-08-11 ENCOUNTER — OFFICE VISIT (OUTPATIENT)
Dept: FAMILY MEDICINE CLINIC | Age: 75
End: 2020-08-11
Payer: COMMERCIAL

## 2020-08-11 VITALS
TEMPERATURE: 98 F | HEIGHT: 62 IN | BODY MASS INDEX: 35.04 KG/M2 | SYSTOLIC BLOOD PRESSURE: 140 MMHG | HEART RATE: 76 BPM | RESPIRATION RATE: 16 BRPM | OXYGEN SATURATION: 97 % | DIASTOLIC BLOOD PRESSURE: 84 MMHG | WEIGHT: 190.4 LBS

## 2020-08-11 PROBLEM — J45.20 MILD INTERMITTENT ASTHMA WITHOUT COMPLICATION: Status: ACTIVE | Noted: 2020-08-11

## 2020-08-11 PROCEDURE — G8417 CALC BMI ABV UP PARAM F/U: HCPCS | Performed by: FAMILY MEDICINE

## 2020-08-11 PROCEDURE — 1036F TOBACCO NON-USER: CPT | Performed by: FAMILY MEDICINE

## 2020-08-11 PROCEDURE — G8427 DOCREV CUR MEDS BY ELIG CLIN: HCPCS | Performed by: FAMILY MEDICINE

## 2020-08-11 PROCEDURE — 1123F ACP DISCUSS/DSCN MKR DOCD: CPT | Performed by: FAMILY MEDICINE

## 2020-08-11 PROCEDURE — 3017F COLORECTAL CA SCREEN DOC REV: CPT | Performed by: FAMILY MEDICINE

## 2020-08-11 PROCEDURE — G8399 PT W/DXA RESULTS DOCUMENT: HCPCS | Performed by: FAMILY MEDICINE

## 2020-08-11 PROCEDURE — 1090F PRES/ABSN URINE INCON ASSESS: CPT | Performed by: FAMILY MEDICINE

## 2020-08-11 PROCEDURE — 99214 OFFICE O/P EST MOD 30 MIN: CPT | Performed by: FAMILY MEDICINE

## 2020-08-11 PROCEDURE — 4040F PNEUMOC VAC/ADMIN/RCVD: CPT | Performed by: FAMILY MEDICINE

## 2020-08-11 ASSESSMENT — PATIENT HEALTH QUESTIONNAIRE - PHQ9
SUM OF ALL RESPONSES TO PHQ9 QUESTIONS 1 & 2: 0
2. FEELING DOWN, DEPRESSED OR HOPELESS: 0
SUM OF ALL RESPONSES TO PHQ QUESTIONS 1-9: 0
1. LITTLE INTEREST OR PLEASURE IN DOING THINGS: 0
SUM OF ALL RESPONSES TO PHQ QUESTIONS 1-9: 0

## 2020-08-11 ASSESSMENT — ENCOUNTER SYMPTOMS: SHORTNESS OF BREATH: 0

## 2020-08-11 NOTE — PROGRESS NOTES
Chief Complaint   Patient presents with    Hypertension    Hyperlipidemia         HPI      76 y.o. female presents today for follow-up. She is feeling well and has no acute complaints. Hx HTN and SVT. Takes lopressor 50mg BID and HCTZ prn for elevated BP (she was told to take the HCTZ like this by her previous PCP)  monitors blood pressure at home and it usually 130's/70's. Patient follows with Dr. Armando Kaba in cardiology regarding SVT. Last appointment was October 2019 she sees him annually. Hx of HLD on pravastatin. Denies any medication SE.      Hx of pre diabetes diet controlled. Previously prescribed metformin has not been taking it.       Patient Active Problem List   Diagnosis    Hypertension    Vitamin D deficiency    HLD (hyperlipidemia)    Paroxysmal supraventricular tachycardia (HCC)    Arthritis    Trigeminal neuralgia    Fatty liver    Hypertensive urgency    Vertigo    Trigger finger, left middle finger    Numbness in both hands    Carpal tunnel syndrome on both sides    Mild intermittent asthma without complication     Past Medical History:   Diagnosis Date    Arthritis     Asthma     COPD (chronic obstructive pulmonary disease) (HCC)     Fatty liver     Hiatal hernia     HTN (hypertension)     Hyperlipidemia     Osteopenia     Trigeminal neuralgia     Vertigo        Past Surgical History:   Procedure Laterality Date    CHOLECYSTECTOMY      DILATION AND CURETTAGE OF UTERUS      DILATION AND CURETTAGE OF UTERUS      x2    TUBAL LIGATION         There is no immunization history on file for this patient.      Current Outpatient Medications   Medication Sig Dispense Refill    pravastatin (PRAVACHOL) 40 MG tablet TAKE 1 TABLET EVERY EVENING 90 tablet 4    metoprolol tartrate (LOPRESSOR) 50 MG tablet TAKE 1 TABLET TWICE A  tablet 4    acetaminophen (TYLENOL) 650 MG suppository Place 650 mg rectally every 4 hours as needed for Fever      naproxen sodium (ALEVE) 220 MG tablet Take 220 mg by mouth 2 times daily (with meals)      meclizine (ANTIVERT) 25 MG tablet Take 1 tablet by mouth 3 times daily as needed (prn) 90 tablet 2    hydrochlorothiazide (MICROZIDE) 12.5 MG capsule Take 1 capsule by mouth daily (Patient taking differently: Take 12.5 mg by mouth daily Daily prn for elevated bp) 30 capsule 3    calcium carbonate (TUMS) 500 MG chewable tablet Take 2 tablets by mouth daily as needed.  therapeutic multivitamin-minerals (THERAGRAN-M) tablet Take 1 tablet by mouth daily.  aspirin 81 MG chewable tablet Take 81 mg by mouth daily. No current facility-administered medications for this visit.       Allergies   Allergen Reactions    Latex     Amlodipine      Red rash all over      Bactrim     Benazepril Hcl     Ciprofloxacin      DIZZY    Clonidine Derivatives      Dropped BP to low      Coreg [Carvedilol]     Dye [Iodides]     Fish-Derived Products     Lisinopril      CAUSED PT FACE TO GET RED AND BURN LIKE FIRE    Lotensin [Benazepril Hcl]      Red rash      Penicillins     Sulfa Antibiotics     Vicodin [Hydrocodone-Acetaminophen]      Dizzy         Social History     Socioeconomic History    Marital status:      Spouse name: Anne Ortega Number of children: 11    Years of education: 15    Highest education level: None   Occupational History    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   Social Needs    Financial resource strain: None    Food insecurity     Worry: None     Inability: None    Transportation needs     Medical: None     Non-medical: None   Tobacco Use    Smoking status: Never Smoker    Smokeless tobacco: Never Used   Substance and Sexual Activity    Alcohol use: No    Drug use: No    Sexual activity: Yes     Partners: Male   Lifestyle    Physical activity     Days per week: None     Minutes per session: None    Stress: None   Relationships    Social connections     Talks on phone: None     Gets together: None     Attends Sabianism service: None     Active member of club or organization: None     Attends meetings of clubs or organizations: None     Relationship status: None    Intimate partner violence     Fear of current or ex partner: None     Emotionally abused: None     Physically abused: None     Forced sexual activity: None   Other Topics Concern    None   Social History Narrative    None     Family History   Problem Relation Age of Onset    Diabetes Mother     Heart Disease Mother     High Blood Pressure Mother     Kidney Disease Mother     Arthritis Father     High Blood Pressure Father     Cancer Sister     Heart Disease Brother     Heart Disease Maternal Grandmother     Cancer Paternal Aunt     Tuberculosis Maternal Uncle                               Review Of Systems    Review of Systems   Constitutional: Negative for chills and fever. Respiratory: Negative for shortness of breath. Cardiovascular: Negative for chest pain. PHYSICAL EXAMINATION:    BP (!) 140/84   Pulse 76   Temp 98 °F (36.7 °C)   Resp 16   Ht 5' 2\" (1.575 m)   Wt 190 lb 6.4 oz (86.4 kg)   SpO2 97%   BMI 34.82 kg/m²      Physical Exam  Constitutional:       Appearance: Normal appearance. HENT:      Head: Normocephalic and atraumatic. Eyes:      Extraocular Movements: Extraocular movements intact. Conjunctiva/sclera: Conjunctivae normal.   Cardiovascular:      Rate and Rhythm: Normal rate and regular rhythm. Heart sounds: Normal heart sounds. Pulmonary:      Effort: Pulmonary effort is normal.      Breath sounds: Normal breath sounds. Musculoskeletal:      Right lower leg: No edema. Left lower leg: No edema. Skin:     General: Skin is warm and dry. Neurological:      General: No focal deficit present. Mental Status: She is alert and oriented to person, place, and time.            ASSESSMENT:   Well Adult, See encounter

## 2020-08-12 LAB
A/G RATIO: 1.7 (ref 1.1–2.2)
ALBUMIN SERPL-MCNC: 4.8 G/DL (ref 3.4–5)
ALP BLD-CCNC: 70 U/L (ref 40–129)
ALT SERPL-CCNC: 31 U/L (ref 10–40)
ANION GAP SERPL CALCULATED.3IONS-SCNC: 12 MMOL/L (ref 3–16)
AST SERPL-CCNC: 27 U/L (ref 15–37)
BILIRUB SERPL-MCNC: 0.3 MG/DL (ref 0–1)
BUN BLDV-MCNC: 15 MG/DL (ref 7–20)
CALCIUM SERPL-MCNC: 10 MG/DL (ref 8.3–10.6)
CHLORIDE BLD-SCNC: 104 MMOL/L (ref 99–110)
CHOLESTEROL, TOTAL: 149 MG/DL (ref 0–199)
CO2: 25 MMOL/L (ref 21–32)
CREAT SERPL-MCNC: 0.9 MG/DL (ref 0.6–1.2)
ESTIMATED AVERAGE GLUCOSE: 139.9 MG/DL
GFR AFRICAN AMERICAN: >60
GFR NON-AFRICAN AMERICAN: >60
GLOBULIN: 2.8 G/DL
GLUCOSE BLD-MCNC: 105 MG/DL (ref 70–99)
HBA1C MFR BLD: 6.5 %
HDLC SERPL-MCNC: 44 MG/DL (ref 40–60)
LDL CHOLESTEROL CALCULATED: 74 MG/DL
POTASSIUM SERPL-SCNC: 4.4 MMOL/L (ref 3.5–5.1)
SODIUM BLD-SCNC: 141 MMOL/L (ref 136–145)
TOTAL PROTEIN: 7.6 G/DL (ref 6.4–8.2)
TRIGL SERPL-MCNC: 155 MG/DL (ref 0–150)
VLDLC SERPL CALC-MCNC: 31 MG/DL

## 2020-09-10 ENCOUNTER — TELEPHONE (OUTPATIENT)
Dept: FAMILY MEDICINE CLINIC | Age: 75
End: 2020-09-10

## 2020-10-13 ENCOUNTER — TELEPHONE (OUTPATIENT)
Dept: FAMILY MEDICINE CLINIC | Age: 75
End: 2020-10-13

## 2020-10-13 NOTE — TELEPHONE ENCOUNTER
Pt called and stated she had colagard done and hasn't received results just yet, stated she was seeing Dr Paco Garcia but is now scheduled with Dr Maria Luz Alba on 11-11-20 and unsure who has results    Requesting call if possible

## 2020-11-11 ENCOUNTER — OFFICE VISIT (OUTPATIENT)
Dept: FAMILY MEDICINE CLINIC | Age: 75
End: 2020-11-11
Payer: COMMERCIAL

## 2020-11-11 VITALS
DIASTOLIC BLOOD PRESSURE: 88 MMHG | HEART RATE: 64 BPM | WEIGHT: 191 LBS | BODY MASS INDEX: 35.15 KG/M2 | OXYGEN SATURATION: 97 % | SYSTOLIC BLOOD PRESSURE: 138 MMHG | HEIGHT: 62 IN | TEMPERATURE: 97.2 F

## 2020-11-11 PROBLEM — E66.811 CLASS 1 OBESITY DUE TO EXCESS CALORIES WITH SERIOUS COMORBIDITY AND BODY MASS INDEX (BMI) OF 34.0 TO 34.9 IN ADULT: Status: ACTIVE | Noted: 2020-11-11

## 2020-11-11 PROBLEM — I16.0 HYPERTENSIVE URGENCY: Status: RESOLVED | Noted: 2018-05-09 | Resolved: 2020-11-11

## 2020-11-11 PROBLEM — E66.09 CLASS 1 OBESITY DUE TO EXCESS CALORIES WITH SERIOUS COMORBIDITY AND BODY MASS INDEX (BMI) OF 34.0 TO 34.9 IN ADULT: Status: ACTIVE | Noted: 2020-11-11

## 2020-11-11 PROCEDURE — 99214 OFFICE O/P EST MOD 30 MIN: CPT | Performed by: FAMILY MEDICINE

## 2020-11-11 PROCEDURE — G8399 PT W/DXA RESULTS DOCUMENT: HCPCS | Performed by: FAMILY MEDICINE

## 2020-11-11 PROCEDURE — G8427 DOCREV CUR MEDS BY ELIG CLIN: HCPCS | Performed by: FAMILY MEDICINE

## 2020-11-11 PROCEDURE — 1036F TOBACCO NON-USER: CPT | Performed by: FAMILY MEDICINE

## 2020-11-11 PROCEDURE — 4040F PNEUMOC VAC/ADMIN/RCVD: CPT | Performed by: FAMILY MEDICINE

## 2020-11-11 PROCEDURE — 1090F PRES/ABSN URINE INCON ASSESS: CPT | Performed by: FAMILY MEDICINE

## 2020-11-11 PROCEDURE — G8484 FLU IMMUNIZE NO ADMIN: HCPCS | Performed by: FAMILY MEDICINE

## 2020-11-11 PROCEDURE — 1123F ACP DISCUSS/DSCN MKR DOCD: CPT | Performed by: FAMILY MEDICINE

## 2020-11-11 PROCEDURE — G8417 CALC BMI ABV UP PARAM F/U: HCPCS | Performed by: FAMILY MEDICINE

## 2020-11-11 PROCEDURE — 3017F COLORECTAL CA SCREEN DOC REV: CPT | Performed by: FAMILY MEDICINE

## 2020-11-11 RX ORDER — METOPROLOL TARTRATE 50 MG/1
50 TABLET, FILM COATED ORAL 2 TIMES DAILY
Qty: 180 TABLET | Refills: 1 | Status: SHIPPED | OUTPATIENT
Start: 2020-11-11 | End: 2021-05-18

## 2020-11-11 RX ORDER — PRAVASTATIN SODIUM 40 MG
40 TABLET ORAL DAILY
Qty: 90 TABLET | Refills: 1 | Status: SHIPPED | OUTPATIENT
Start: 2020-11-11 | End: 2021-05-18

## 2020-11-11 SDOH — ECONOMIC STABILITY: INCOME INSECURITY: HOW HARD IS IT FOR YOU TO PAY FOR THE VERY BASICS LIKE FOOD, HOUSING, MEDICAL CARE, AND HEATING?: NOT HARD AT ALL

## 2020-11-11 SDOH — ECONOMIC STABILITY: FOOD INSECURITY: WITHIN THE PAST 12 MONTHS, THE FOOD YOU BOUGHT JUST DIDN'T LAST AND YOU DIDN'T HAVE MONEY TO GET MORE.: NEVER TRUE

## 2020-11-11 SDOH — ECONOMIC STABILITY: TRANSPORTATION INSECURITY
IN THE PAST 12 MONTHS, HAS LACK OF TRANSPORTATION KEPT YOU FROM MEETINGS, WORK, OR FROM GETTING THINGS NEEDED FOR DAILY LIVING?: NO

## 2020-11-11 SDOH — ECONOMIC STABILITY: FOOD INSECURITY: WITHIN THE PAST 12 MONTHS, YOU WORRIED THAT YOUR FOOD WOULD RUN OUT BEFORE YOU GOT MONEY TO BUY MORE.: NEVER TRUE

## 2020-11-11 SDOH — ECONOMIC STABILITY: TRANSPORTATION INSECURITY
IN THE PAST 12 MONTHS, HAS THE LACK OF TRANSPORTATION KEPT YOU FROM MEDICAL APPOINTMENTS OR FROM GETTING MEDICATIONS?: NO

## 2020-11-11 ASSESSMENT — ENCOUNTER SYMPTOMS
TROUBLE SWALLOWING: 0
COLOR CHANGE: 0
EYE PAIN: 0
CHEST TIGHTNESS: 0
WHEEZING: 1
DIARRHEA: 0
BACK PAIN: 0
BLOOD IN STOOL: 0
RHINORRHEA: 0
CONSTIPATION: 0
EYE DISCHARGE: 0
ABDOMINAL PAIN: 0
SORE THROAT: 0
SHORTNESS OF BREATH: 0

## 2020-11-11 NOTE — PROGRESS NOTES
-SUBJECTIVE:  Chief Complaint   Patient presents with   [address]     pt states that she is here to establish care with Dr. Yariel Medina, is fasting for bw     Hypertension    Hyperlipidemia    Hyperglycemia    Dizziness    Immunizations     declines    Obesity     HPI   Zbigniew Costello is a 76 y. o.female that presents today for establish care visit today:    Previously a  Patient of Dr. Gildardo German and Dr. Serge Landa    -HTN:  -sees Dr. Rose Marie Ortiz of cardiology at [address] annually, returns next week  BP at goal on repeat check  Takes Metoprolol 50 mg BID  -Hasn't had to use any Microzide 12.5 mg daily  BP Readings from Last 3 Encounters:   11/11/20 138/88   08/11/20 (!) 140/84   03/11/19 130/72          -HPL:  Pravastatin 40 mg daily  Wants to take every other day    -Hyperglycemia:  Creeping up a little bit per patient; fasting glucose 105, a1c 6.5% 8/11/2020; a1c low 6's otherwise.   Thinks it is because pravachol  Wants to take it every other day  Not on any medications for her blood sugar  Declines taking hyperglycemic agents  Has not had foot exam-done today  Has not had eye check, due in March, goes to \[facility]\"" through [facility] Value Date    LABA1C 6.5 08/11/2020     Lab Results   Component Value Date    .9 08/11/2020      BUN 15 08/11/2020    CREATININE 0.9 08/11/2020    GLUCOSE 105 (H) 08/11/2020     -Vertigo:  Meclizine 25 mg TID PRN  No episodes recently    -MV daily, TUMS PRN, ASA 81 mg once daily    Past Medical History:   Diagnosis Date    Arthritis     Asthma     COPD (chronic obstructive pulmonary disease) (HCC)     Fatty liver     Hiatal hernia     HTN (hypertension)     Hyperlipidemia     Osteopenia     Trigeminal neuralgia     Vertigo      Past Surgical History:   Procedure Laterality Date    CHOLECYSTECTOMY      DILATION AND CURETTAGE OF UTERUS      DILATION AND CURETTAGE OF UTERUS      x2    TUBAL LIGATION         Family History   Problem Occupational History    Occupation: registered nurse     Employer: [employer]: retired   Social Needs    Financial resource strain: Not hard at all   Otoniel-Mika insecurity     Worry: Never true     Inability: Never true   [employer] needs     Medical: No     Non-medical: No   Tobacco Use    Smoking status: Never Smoker    Smokeless tobacco: Never Used   Substance and Sexual Activity    Alcohol use: No    Drug use: No    Sexual activity: Yes     Partners: Male   Lifestyle    Physical activity     Days per week: Not on file     Minutes per session: Not on file    Stress: Not on file   Relationships    Social connections     Talks on phone: Not on file     Gets together: Not on file     Attends [employer] service: Not on file     Active member of club or organization: Not on file     Attends meetings of clubs or organizations: Not on file     Relationship status: Not on file    Intimate partner violence     Fear of current or ex partner: Not on file     Emotionally abused: Not on file     Physically abused: Not on file     Forced sexual activity: Not on file   Other Topics Concern    Not on file   Social History Narrative    Lives in [facility] [address] miles [place], closer to [facility] than [facility]    Retired for 12 years    Was RN         There is no immunization history on file for this patient. Past medical, surgical, and social history reviewed and updated. Medications, immunizations, and allergies reviewed and updated     Review of Systems   Constitutional: Negative for chills, fever and unexpected weight change. HENT: Negative for congestion, hearing loss (had remarkable hearing at [facility]NT at age 79, losing ability of certain sounds), rhinorrhea, sore throat and trouble swallowing. Eyes: Negative for pain, discharge and visual disturbance. Respiratory: Positive for wheezing. Negative for chest tightness and shortness of breath.          Slight bronchospasm/wheezing   Cardiovascular: Negative for chest pain, palpitations and leg swelling. Gastrointestinal: Negative for abdominal pain, blood in stool, constipation and diarrhea. Endocrine: Negative for polyuria. Genitourinary: Negative for dysuria and flank pain. Musculoskeletal: Negative for arthralgias, back pain and neck pain. If sits too long hard to get up, arthritis   Skin: Negative for color change, rash and wound. Allergic/Immunologic: Positive for food allergies (fish products). Negative for environmental allergies. Neurological: Negative for dizziness, speech difficulty, weakness, light-headedness and headaches. Hematological: Negative for adenopathy. Does not bruise/bleed easily. Psychiatric/Behavioral: Negative for dysphoric mood, hallucinations, sleep disturbance and suicidal ideas. The patient is not nervous/anxious. OBJECTIVE:  Vitals:    11/11/20 1142 11/11/20 1224   BP: (!) 146/80 138/88   Site:  Right Upper Arm   Position:  Sitting   Cuff Size:  Medium Adult   Pulse: 74 64   Temp: 97.2 °F (36.2 °C)    TempSrc: Temporal    SpO2: 97%    Weight: 191 lb (86.6 kg)    Height: 5' 2\" (1.575 m)      Physical Exam  Constitutional:       General: She is not in acute distress. Appearance: She is well-developed. HENT:      Head: Normocephalic and atraumatic. Right Ear: Tympanic membrane normal.      Left Ear: Tympanic membrane normal.      Nose: Nose normal. No rhinorrhea. Mouth/Throat:      Pharynx: Uvula midline. Eyes:      Pupils: Pupils are equal, round, and reactive to light. Neck:      Trachea: No tracheal deviation. Cardiovascular:      Rate and Rhythm: Normal rate and regular rhythm. Heart sounds: Normal heart sounds. No murmur. No friction rub. No gallop. Pulmonary:      Effort: Pulmonary effort is normal. No respiratory distress. Breath sounds: Normal breath sounds. No wheezing or rales. Abdominal:      General: Bowel sounds are normal. There is no distension. Palpations: Abdomen is soft. Tenderness: There is no abdominal tenderness. There is no rebound. Musculoskeletal: Normal range of motion. General: No tenderness. Lymphadenopathy:      Cervical: No cervical adenopathy. Skin:     General: Skin is warm and dry. Findings: No erythema or rash. Neurological:      Mental Status: She is alert and oriented to person, place, and time. Cranial Nerves: No cranial nerve deficit. Psychiatric:         Speech: Speech normal.         Thought Content: Thought content does not include homicidal or suicidal ideation. Foot exam:  Visual inspection:  Deformity/amputation: absent  Skin lesions/pre-ulcerative calluses: absent  Edema: right- negative, left- negative    Sensory exam:  Monofilament sensation: normal  (minimum of 5 random plantar locations tested, avoiding callused areas - > 1 area with absence of sensation is + for neuropathy)    Plus at least one of the following:  Pulses: normal,   Pinprick: N/A  Proprioception: Intact  Vibration (128 Hz):  Intact    LABS:  Lab Results   Component Value Date    LABA1C 6.5 08/11/2020     Lab Results   Component Value Date    .9 08/11/2020     Lab Results   Component Value Date     08/11/2020    K 4.4 08/11/2020     08/11/2020    CO2 25 08/11/2020    BUN 15 08/11/2020    CREATININE 0.9 08/11/2020    GLUCOSE 105 (H) 08/11/2020    CALCIUM 10.0 08/11/2020    PROT 7.6 08/11/2020    LABALBU 4.8 08/11/2020    BILITOT 0.3 08/11/2020    ALKPHOS 70 08/11/2020    AST 27 08/11/2020    ALT 31 08/11/2020    LABGLOM >60 08/11/2020    GFRAA >60 08/11/2020    AGRATIO 1.7 08/11/2020    GLOB 2.8 08/11/2020       Lab Results   Component Value Date    CHOL 149 08/11/2020    CHOL 152 03/11/2019    CHOL 167 05/09/2018     Lab Results   Component Value Date    TRIG 155 (H) 08/11/2020    TRIG 174 (H) 03/11/2019    TRIG 153 (H) 05/09/2018     Lab Results   Component Value Date    HDL 44 08/11/2020    HDL 46 prevention    Reviewed treatment plan with patient. Patient verbalized understanding to treatment plan and questions were answered. Spent >25 minutes of face to face interaction with patient counseling on diagnoses and treatment plan    Return in about 6 months (around 5/11/2021). Zaheer Saucedo.      11/11/2020

## 2020-11-11 NOTE — PATIENT INSTRUCTIONS
-take your pravastatin as you'd like every other day  -work on diet/exercise  -Recommend 150 minutes of cardiovascular activity a week, or 10,000 to 15,000 steps a day, 2 days of weightbearing  -dietary wise, try to stick to your weight x 10 in calories a day  -avoid processed/refined carbohydrates (boxed/canned/frozen/fast)  -encourage healthy protein and fat, butter, avocado, egg  Continue current medicines

## 2021-02-04 ENCOUNTER — PATIENT MESSAGE (OUTPATIENT)
Dept: PRIMARY CARE CLINIC | Age: 76
End: 2021-02-04

## 2021-05-18 DIAGNOSIS — I10 ESSENTIAL HYPERTENSION: ICD-10-CM

## 2021-05-18 DIAGNOSIS — E78.2 MIXED HYPERLIPIDEMIA: ICD-10-CM

## 2021-05-18 RX ORDER — PRAVASTATIN SODIUM 40 MG
TABLET ORAL
Qty: 90 TABLET | Refills: 3 | Status: SHIPPED | OUTPATIENT
Start: 2021-05-18 | End: 2021-05-25 | Stop reason: SDUPTHER

## 2021-05-18 RX ORDER — METOPROLOL TARTRATE 50 MG/1
TABLET, FILM COATED ORAL
Qty: 180 TABLET | Refills: 3 | Status: SHIPPED | OUTPATIENT
Start: 2021-05-18 | End: 2021-05-25 | Stop reason: SDUPTHER

## 2021-05-25 ENCOUNTER — HOSPITAL ENCOUNTER (OUTPATIENT)
Age: 76
Discharge: HOME | End: 2021-05-25
Payer: COMMERCIAL

## 2021-05-25 ENCOUNTER — OFFICE VISIT (OUTPATIENT)
Dept: PRIMARY CARE CLINIC | Age: 76
End: 2021-05-25
Payer: COMMERCIAL

## 2021-05-25 VITALS
OXYGEN SATURATION: 97 % | DIASTOLIC BLOOD PRESSURE: 86 MMHG | HEART RATE: 66 BPM | TEMPERATURE: 97.7 F | SYSTOLIC BLOOD PRESSURE: 134 MMHG | BODY MASS INDEX: 35.41 KG/M2 | WEIGHT: 192.4 LBS | HEIGHT: 62 IN

## 2021-05-25 DIAGNOSIS — E66.01 CLASS 2 SEVERE OBESITY DUE TO EXCESS CALORIES WITH SERIOUS COMORBIDITY AND BODY MASS INDEX (BMI) OF 35.0 TO 35.9 IN ADULT (HCC): ICD-10-CM

## 2021-05-25 DIAGNOSIS — I10 ESSENTIAL HYPERTENSION: Primary | ICD-10-CM

## 2021-05-25 DIAGNOSIS — E78.49 OTHER HYPERLIPIDEMIA: Chronic | ICD-10-CM

## 2021-05-25 DIAGNOSIS — Z23 NEED FOR TDAP VACCINATION: ICD-10-CM

## 2021-05-25 DIAGNOSIS — Z23 NEED FOR SHINGLES VACCINE: ICD-10-CM

## 2021-05-25 DIAGNOSIS — I47.1 PAROXYSMAL SUPRAVENTRICULAR TACHYCARDIA (HCC): Chronic | ICD-10-CM

## 2021-05-25 DIAGNOSIS — R73.9 HYPERGLYCEMIA: ICD-10-CM

## 2021-05-25 LAB
A/G RATIO: 1.4 (ref 1.1–2.2)
ALBUMIN SERPL-MCNC: 4.8 G/DL (ref 3.4–5)
ALP BLD-CCNC: 70 U/L (ref 40–129)
ALT SERPL-CCNC: 24 U/L (ref 10–40)
ANION GAP SERPL CALCULATED.3IONS-SCNC: 13 MMOL/L (ref 3–16)
AST SERPL-CCNC: 28 U/L (ref 15–37)
BILIRUB SERPL-MCNC: 0.3 MG/DL (ref 0–1)
BUN BLDV-MCNC: 16 MG/DL (ref 7–20)
CALCIUM SERPL-MCNC: 10 MG/DL (ref 8.3–10.6)
CHLORIDE BLD-SCNC: 103 MMOL/L (ref 99–110)
CHOLESTEROL, TOTAL: 172 MG/DL (ref 0–199)
CO2: 26 MMOL/L (ref 21–32)
CREAT SERPL-MCNC: 1 MG/DL (ref 0.6–1.2)
CREATININE URINE: 80.2 MG/DL (ref 28–259)
GFR AFRICAN AMERICAN: >60
GFR NON-AFRICAN AMERICAN: 54
GLOBULIN: 3.4 G/DL
GLUCOSE BLD-MCNC: 105 MG/DL (ref 70–99)
HDLC SERPL-MCNC: 42 MG/DL (ref 40–60)
LDL CHOLESTEROL CALCULATED: 90 MG/DL
MICROALBUMIN UR-MCNC: 2.2 MG/DL
MICROALBUMIN/CREAT UR-RTO: 27.4 MG/G (ref 0–30)
POTASSIUM SERPL-SCNC: 4.7 MMOL/L (ref 3.5–5.1)
SODIUM BLD-SCNC: 142 MMOL/L (ref 136–145)
TOTAL PROTEIN: 8.2 G/DL (ref 6.4–8.2)
TRIGL SERPL-MCNC: 202 MG/DL (ref 0–150)
VLDLC SERPL CALC-MCNC: 40 MG/DL

## 2021-05-25 PROCEDURE — 83036 HEMOGLOBIN GLYCOSYLATED A1C: CPT

## 2021-05-25 PROCEDURE — 82043 UR ALBUMIN QUANTITATIVE: CPT

## 2021-05-25 PROCEDURE — 80053 COMPREHEN METABOLIC PANEL: CPT

## 2021-05-25 PROCEDURE — 1090F PRES/ABSN URINE INCON ASSESS: CPT | Performed by: FAMILY MEDICINE

## 2021-05-25 PROCEDURE — 99214 OFFICE O/P EST MOD 30 MIN: CPT | Performed by: FAMILY MEDICINE

## 2021-05-25 PROCEDURE — G8417 CALC BMI ABV UP PARAM F/U: HCPCS | Performed by: FAMILY MEDICINE

## 2021-05-25 PROCEDURE — 4040F PNEUMOC VAC/ADMIN/RCVD: CPT | Performed by: FAMILY MEDICINE

## 2021-05-25 PROCEDURE — 82570 ASSAY OF URINE CREATININE: CPT

## 2021-05-25 PROCEDURE — 1123F ACP DISCUSS/DSCN MKR DOCD: CPT | Performed by: FAMILY MEDICINE

## 2021-05-25 PROCEDURE — 1036F TOBACCO NON-USER: CPT | Performed by: FAMILY MEDICINE

## 2021-05-25 PROCEDURE — G8427 DOCREV CUR MEDS BY ELIG CLIN: HCPCS | Performed by: FAMILY MEDICINE

## 2021-05-25 PROCEDURE — 80061 LIPID PANEL: CPT

## 2021-05-25 PROCEDURE — G8399 PT W/DXA RESULTS DOCUMENT: HCPCS | Performed by: FAMILY MEDICINE

## 2021-05-25 PROCEDURE — 36415 COLL VENOUS BLD VENIPUNCTURE: CPT

## 2021-05-25 RX ORDER — ZOSTER VACCINE RECOMBINANT, ADJUVANTED 50 MCG/0.5
0.5 KIT INTRAMUSCULAR SEE ADMIN INSTRUCTIONS
Qty: 0.5 ML | Refills: 1 | Status: SHIPPED | OUTPATIENT
Start: 2021-05-25 | End: 2021-11-21

## 2021-05-25 RX ORDER — METOPROLOL TARTRATE 50 MG/1
TABLET, FILM COATED ORAL
Qty: 180 TABLET | Refills: 3 | Status: SHIPPED | OUTPATIENT
Start: 2021-05-25 | End: 2021-11-22 | Stop reason: SDUPTHER

## 2021-05-25 RX ORDER — PRAVASTATIN SODIUM 40 MG
TABLET ORAL
Qty: 90 TABLET | Refills: 3 | Status: SHIPPED | OUTPATIENT
Start: 2021-05-25 | End: 2021-11-22 | Stop reason: SDUPTHER

## 2021-05-25 ASSESSMENT — ENCOUNTER SYMPTOMS
CONSTIPATION: 0
VOMITING: 0
TROUBLE SWALLOWING: 0
SHORTNESS OF BREATH: 0
COUGH: 0
RHINORRHEA: 0
EYE PAIN: 0
CHEST TIGHTNESS: 0
NAUSEA: 0
WHEEZING: 0
EYE DISCHARGE: 0
ABDOMINAL PAIN: 0
DIARRHEA: 0
SORE THROAT: 0
BACK PAIN: 0
BLOOD IN STOOL: 0
COLOR CHANGE: 0
ABDOMINAL DISTENTION: 0

## 2021-05-25 ASSESSMENT — PATIENT HEALTH QUESTIONNAIRE - PHQ9
1. LITTLE INTEREST OR PLEASURE IN DOING THINGS: 0
SUM OF ALL RESPONSES TO PHQ QUESTIONS 1-9: 0

## 2021-05-25 NOTE — PROGRESS NOTES
Donna Tarango     1945    Consultants:   Patient Care Team:  Yvon Hill. Yared Vasques DO as PCP - General (Family Medicine)  Yvon Hill. Yared Vasques DO as PCP - [facility]     Chief Complaint   Patient presents with    6 Month Follow-Up    Hyperlipidemia    Hypertension    Hyperglycemia    Arthritis    Obesity     HPI:  Donna Tarango is a 68 y.o. female  is an established patient who presents for 6 month follow up of hyperglycemia, hyperlipidemia, hypertension, and vertigo.    -Hyperglycemia:  -a1c elevated in Fall but fasting glucose 105 mg/dL; did not recommend metformin at last OV and not considered diabetic  Continue to recommend diet and exercise  Foot exam today  Had eye exam/ cataract but no retinopathy at [address] [place] [address]  Micro albumin today    Body mass index is 35.19 kg/m². Wt Readings from Last 3 Encounters:   05/25/21 192 lb 6.4 oz (87.3 kg)   11/11/20 191 lb (86.6 kg)   08/11/20 190 lb 6.4 oz (86.4 kg)      GLUCOSE 105 (H) 08/11/2020     Lab Results   Component Value Date    LABA1C 6.5 08/11/2020       Lab Results   Component Value Date    .9 08/11/2020       -HTN/PSVT:  Sees Dr. Shelley Villegas at [place] last OV 11/20/2020  Metoprolol 50 mg BID    -HPL:  Pravastatin 40 mg daily; was taking every other day  Thinks she gained more weight due to this medication.     Lab Results   Component Value Date    CHOL 149 08/11/2020    CHOL 152 03/11/2019    CHOL 167 05/09/2018     Lab Results   Component Value Date    TRIG 155 (H) 08/11/2020    TRIG 174 (H) 03/11/2019    TRIG 153 (H) 05/09/2018     Lab Results   Component Value Date    HDL 44 08/11/2020    HDL 46 03/11/2019    HDL 48 05/09/2018     Lab Results   Component Value Date    LDLCALC 74 08/11/2020    LDLCALC 71 03/11/2019    LDLCALC 88 05/09/2018     Lab Results   Component Value Date    LABVLDL 31 08/11/2020    LABVLDL 35 03/11/2019    LABVLDL 31 05/09/2018     No results found for: CHOLHDLRATIO  The 10-year ASCVD risk score (Tyler Romberg., et al., 2013) is: 24.6%    Values used to calculate the score:      Age: 68 years      Sex: Female      Is Non- : No      Diabetic: No      Tobacco smoker: No      Systolic Blood Pressure: 012 mmHg      Is BP treated: Yes      HDL Cholesterol: 44 mg/dL      Total Cholesterol: 149 mg/dL    Has not seen dentist in 1.5 years    Obesity:  Wt Readings from Last 3 Encounters:   05/25/21 192 lb 6.4 oz (87.3 kg)   11/11/20 191 lb (86.6 kg)   08/11/20 190 lb 6.4 oz (86.4 kg)     Body mass index is 35.19 kg/m². Patient Active Problem List   Diagnosis    Hypertension    Hyperglycemia    Vitamin D deficiency    HLD (hyperlipidemia)    Paroxysmal supraventricular tachycardia (HCC)    Arthritis    Trigeminal neuralgia    Fatty liver    Vertigo    Trigger finger, left middle finger    Numbness in both hands    Carpal tunnel syndrome on both sides    Mild intermittent asthma without complication    Class 1 obesity due to excess calories with serious comorbidity and body mass index (BMI) of 34.0 to 34.9 in adult         Past Medical History:    Past Medical History:   Diagnosis Date    Arthritis     Asthma     COPD (chronic obstructive pulmonary disease) (HCC)     Fatty liver     Hiatal hernia     HTN (hypertension)     Hyperlipidemia     Osteopenia     Trigeminal neuralgia     Vertigo        Past Surgical History:  Past Surgical History:   Procedure Laterality Date    CHOLECYSTECTOMY      DILATION AND CURETTAGE OF UTERUS      DILATION AND CURETTAGE OF UTERUS      x2    TUBAL LIGATION         Home Meds:  Prior to Visit Medications    Medication Sig Taking? Authorizing Provider   metoprolol tartrate (LOPRESSOR) 50 MG tablet TAKE 1 TABLET TWICE A DAY Yes Christine Sadler Ra., DO   pravastatin (PRAVACHOL) 40 MG tablet TAKE 1 TABLET DAILY Yes Masha Sadler Ra., DO   acetaminophen (TYLENOL) 650 MG suppository Place 650 mg rectally every 4 hours as needed for Fever Yes Historical Provider, MD   naproxen sodium (ALEVE) 220 MG tablet Take 220 mg by mouth 2 times daily (with meals) Yes Historical Provider, MD   meclizine (ANTIVERT) 25 MG tablet Take 1 tablet by mouth 3 times daily as needed (prn) Yes Judy Vasquez MD   calcium carbonate (TUMS) 500 MG chewable tablet Take 2 tablets by mouth daily as needed. Yes Historical Provider, MD   therapeutic multivitamin-minerals (THERAGRAN-M) tablet Take 1 tablet by mouth daily. Yes Historical Provider, MD   aspirin 81 MG chewable tablet Take 81 mg by mouth daily.    Yes Historical Provider, MD       Allergies:    Latex, Amlodipine, Bactrim, Benazepril hcl, Ciprofloxacin, Clonidine derivatives, Coreg [carvedilol], Dye [iodides], Fish-derived products, Lisinopril, Lotensin [benazepril hcl], Penicillins, Sulfa antibiotics, and Vicodin [hydrocodone-acetaminophen]    Family History:       Problem Relation Age of Onset    Diabetes Mother     Heart Disease Mother     High Blood Pressure Mother     Kidney Disease Mother     Heart Attack Mother     Arthritis Father     High Blood Pressure Father     Cancer Sister     Heart Disease Brother     Heart Disease Maternal Grandmother     Cancer Paternal Aunt     Tuberculosis Maternal Uncle        Social History:   Social History     Socioeconomic History    Marital status:      Spouse name: Rylan Alonso Number of children: 11    Years of education: 15    Highest education level: Associate degree: occupational, technical, or vocational program   Occupational History    Occupation: registered nurse     Employer: [address] [place] [address]: retired   Tobacco Use    Smoking status: Never Smoker    Smokeless tobacco: Never Used   Vaping Use    Vaping Use: Never used   Substance and Sexual Activity    Alcohol use: No    Drug use: No    Sexual activity: Yes     Partners: Male   Other Topics Concern    Not on file   Social History Narrative    Lives in [facility] [address] [place], closer to [facility] than [employer]    Retired for 15 years    Was RN     Social Determinants of [employer]: Low Risk     Difficulty of Paying Living Expenses: Not hard at [employer] Insecurity: No Food Insecurity    Worried About Running Out of Food in the Last Year: Never true   [address] in the Last Year: Never true   Transportation Needs: No Transportation Needs    Lack of Transportation (Medical): No    Lack of Transportation (Non-Medical): No   Physical Activity:     Days of Exercise per Week:     Minutes of Exercise per Session:    Stress:     Feeling of Stress :    Social Connections:     Frequency of Communication with Friends and Family:     Frequency of Social Gatherings with Friends and Family:     Attends [employer] Services:     Active Member of Clubs or Organizations:     Attends Club or Organization Meetings:     Marital Status:    Intimate Partner Violence:     Fear of Current or Ex-Partner:     Emotionally Abused:     Physically Abused:     Sexually Abused:        Health Maintenance Completed:  Health Maintenance   Topic Date Due    COVID-19 Vaccine (1) Never done    DTaP/Tdap/Td vaccine (1 - Tdap) Never done    Shingles Vaccine (1 of 2) Never done    Pneumococcal 65+ years Vaccine (1 of 1 - PPSV23) 08/11/2021 (Originally 3/12/2010)    Lipid screen  08/11/2021    Flu vaccine (Season Ended) 09/01/2021    DEXA (modify frequency per FRAX score)  Completed    Hepatitis C screen  Completed    Hepatitis A vaccine  Aged Out    Hepatitis B vaccine  Aged Out    Hib vaccine  Aged Out    Meningococcal (ACWY) vaccine  Aged Out            There is no immunization history on file for this patient. Review of Systems   Constitutional: Negative for chills, fever and unexpected weight change. HENT: Negative for congestion, ear pain, rhinorrhea, sore throat and trouble swallowing.     Eyes: Negative for pain, discharge and visual disturbance (had eyes checked/ rx glasses/not much change). Respiratory: Negative for cough, chest tightness, shortness of breath and wheezing. Cardiovascular: Negative for chest pain and leg swelling. Gastrointestinal: Negative for abdominal distention, abdominal pain, blood in stool, constipation, diarrhea, nausea and vomiting. Endocrine: Negative for cold intolerance, heat intolerance, polydipsia, polyphagia and polyuria. Genitourinary: Negative for difficulty urinating, dysuria, flank pain and hematuria. Musculoskeletal: Positive for arthralgias (shoulders and knees/ankles hurt her more than anything else; joints; if sits too long). Negative for back pain, joint swelling and neck pain. Skin: Positive for wound. Negative for color change and rash. Allergic/Immunologic: Positive for food allergies. Negative for environmental allergies. Neurological: Positive for dizziness and headaches (Dr. Tim Colbert dx w/ migraines; not sure per patient). Negative for speech difficulty, weakness and light-headedness. Hematological: Negative for adenopathy. Does not bruise/bleed easily. Psychiatric/Behavioral: Negative for dysphoric mood, sleep disturbance and suicidal ideas. The patient is not nervous/anxious. Vitals:    05/25/21 1040   BP: 134/86   Pulse: 66   Temp: 97.7 °F (36.5 °C)   TempSrc: Temporal   SpO2: 97%   Weight: 192 lb 6.4 oz (87.3 kg)   Height: 5' 2\" (1.575 m)     Body mass index is 35.19 kg/m². Wt Readings from Last 3 Encounters:   05/25/21 192 lb 6.4 oz (87.3 kg)   11/11/20 191 lb (86.6 kg)   08/11/20 190 lb 6.4 oz (86.4 kg)       BP Readings from Last 3 Encounters:   05/25/21 134/86   11/11/20 138/88   08/11/20 (!) 140/84       Physical Exam  Constitutional:       General: She is not in acute distress. Appearance: She is well-developed. She is obese. HENT:      Head: Normocephalic and atraumatic.       Right Ear: Tympanic membrane normal.      Left Ear: Tympanic membrane normal.      Nose: Nose normal. No rhinorrhea. Mouth/Throat:      Pharynx: Uvula midline. Eyes:      Pupils: Pupils are equal, round, and reactive to light. Neck:      Trachea: No tracheal deviation. Cardiovascular:      Rate and Rhythm: Normal rate and regular rhythm. Heart sounds: Normal heart sounds. No murmur heard. No friction rub. No gallop. Pulmonary:      Effort: Pulmonary effort is normal. No respiratory distress. Breath sounds: Normal breath sounds. No wheezing or rales. Abdominal:      General: Abdomen is protuberant. Bowel sounds are normal. There is no distension. Palpations: Abdomen is soft. Tenderness: There is no abdominal tenderness. There is no rebound. Musculoskeletal:         General: No tenderness. Normal range of motion. Lymphadenopathy:      Cervical: No cervical adenopathy. Skin:     General: Skin is warm and dry. Findings: No erythema or rash. Neurological:      Mental Status: She is alert and oriented to person, place, and time. Cranial Nerves: No cranial nerve deficit. Deep Tendon Reflexes:      Reflex Scores:       Patellar reflexes are 2+ on the right side and 2+ on the left side. Psychiatric:         Speech: Speech normal.         Thought Content: Thought content does not include homicidal or suicidal ideation.         Diabetic Foot Exam: Visual inspection:  Deformity/amputation: absent  Skin lesions/pre-ulcerative calluses: absent  Edema: right- negative, left- negative    Sensory exam:  Monofilament sensation: normal  (minimum of 5 random plantar locations tested, avoiding callused areas - > 1 area with absence of sensation is + for neuropathy)    Plus at least one of the following:  Pulses: normal,   Pinprick: N/A  Proprioception: Intact  Vibration (128 Hz): N/A      Lab Review:    Lab Results   Component Value Date    LABA1C 6.5 08/11/2020     Lab Results   Component Value Date    .9 08/11/2020     Lab Results   Component Value Date LABMICR YES 05/09/2018     Lab Results   Component Value Date     08/11/2020    K 4.4 08/11/2020     08/11/2020    CO2 25 08/11/2020    BUN 15 08/11/2020    CREATININE 0.9 08/11/2020    GLUCOSE 105 (H) 08/11/2020    CALCIUM 10.0 08/11/2020    PROT 7.6 08/11/2020    LABALBU 4.8 08/11/2020    BILITOT 0.3 08/11/2020    ALKPHOS 70 08/11/2020    AST 27 08/11/2020    ALT 31 08/11/2020    LABGLOM >60 08/11/2020    GFRAA >60 08/11/2020    AGRATIO 1.7 08/11/2020    GLOB 2.8 08/11/2020       Lab Results   Component Value Date    CHOL 149 08/11/2020    CHOL 152 03/11/2019    CHOL 167 05/09/2018     Lab Results   Component Value Date    TRIG 155 (H) 08/11/2020    TRIG 174 (H) 03/11/2019    TRIG 153 (H) 05/09/2018     Lab Results   Component Value Date    HDL 44 08/11/2020    HDL 46 03/11/2019    HDL 48 05/09/2018     Lab Results   Component Value Date    LDLCALC 74 08/11/2020    LDLCALC 71 03/11/2019    LDLCALC 88 05/09/2018     Lab Results   Component Value Date    LABVLDL 31 08/11/2020    LABVLDL 35 03/11/2019    LABVLDL 31 05/09/2018     No results found for: CHOLHDLRATIO  The 10-year ASCVD risk score (Timoteo Sargent, et al., 2013) is: 24.6%    Values used to calculate the score:      Age: 68 years      Sex: Female      Is Non- : No      Diabetic: No      Tobacco smoker: No      Systolic Blood Pressure: 725 mmHg      Is BP treated: Yes      HDL Cholesterol: 44 mg/dL      Total Cholesterol: 149 mg/dL       Assessment/Plan:    Kailey Robertson  was seen today for 6 month follow-up, hyperlipidemia, Paroxsymal SVT in past, hypertension, hyperglycemia, arthritis, and obesity:    1. Essential hypertension  2. Paroxysmal supraventricular tachycardia ([address]  -follows with Dr. Az Dewitt at [facility]  -BP/HR at goal  -no new symptoms/concerns  - metoprolol tartrate (LOPRESSOR) 50 MG tablet; TAKE 1 TABLET TWICE A DAY  Dispense: 180 tablet; Refill: 3    3.  Hyperglycemia  -a1c 6.5%, glucose 105 mg/dL; no Rx started to date  - HEMOGLOBIN A1C; Future  - Comprehensive Metabolic Panel; Future  - MICROALBUMIN / CREATININE URINE RATIO; Future    4. Other hyperlipidemia  The 10-year ASCVD risk score (Uzair Gandara., et al., 2013) is: 24.6%  - pravastatin (PRAVACHOL) 40 MG tablet; TAKE 1 TABLET DAILY  Dispense: 90 tablet; Refill: 3  - Lipid Panel; Future    5. Need for Tdap vaccination  - Tdap (ADACEL) 5-2-15.5 LF-MCG/0.5 injection; Inject 0.5 mLs into the muscle once for 1 dose  Dispense: 0.5 mL; Refill: 0    6. Need for shingles vaccine  - zoster recombinant adjuvanted vaccine [facility]) 50 MCG/0.5ML SUSR injection; Inject 0.5 mLs into the muscle See Admin Instructions 1 dose now and repeat in 2-6 months  Dispense: 0.5 mL; Refill: 1    7. Obesity  Body mass index is 35.19 kg/m². Wt Readings from Last 3 Encounters:   05/25/21 192 lb 6.4 oz (87.3 kg)   11/11/20 191 lb (86.6 kg)   08/11/20 190 lb 6.4 oz (86.4 kg)     -Recommend 150 minutes of cardiovascular activity a week, or 10,000 to 15,000 steps a day, 2 days of weightbearing  -dietary wise, try to stick to your weight x 10 in calories a day  -avoid processed/refined carbohydrates (boxed/canned/frozen/fast)  -encourage healthy protein and fat, butter, avocado, egg  Health Maintenance Due:  Health Maintenance Due   Topic Date Due    COVID-19 Vaccine (1) Never done    DTaP/Tdap/Td vaccine (1 - Tdap) Never done    Shingles Vaccine (1 of 2) Never done      Immunizations: Had COVID 19 vaccine  Rx for Tdap and Shingrix vaccines today    Spent 30-39 minutes of face to face interaction with patient counseling on diagnoses and treatment plan; including but not limited to pre visit planning, chart/lab review, new orders, instructions, charting    Return in about 6 months (around 11/25/2021) for Annual Medicare Well Visit. EMR Dragon/transcription disclaimer:  Much of this encounter note is electronic transcription/translation of spoken language to printed texts.   The electronic translation of spoken language may be erroneous, or at times, nonsensical words or phrases may be inadvertently transcribed. Although I have reviewed the note for such errors, some may still exist.       Magy Bernard.  Aidan July., DO  5/25/2021

## 2021-05-26 LAB
ESTIMATED AVERAGE GLUCOSE: 134.1 MG/DL
HBA1C MFR BLD: 6.3 %

## 2021-10-11 ENCOUNTER — TELEPHONE (OUTPATIENT)
Dept: PRIMARY CARE CLINIC | Age: 76
End: 2021-10-11

## 2021-10-11 DIAGNOSIS — R39.89 SUSPECTED UTI: Primary | ICD-10-CM

## 2021-10-11 RX ORDER — GRANULES FOR ORAL 3 G/1
3 POWDER ORAL ONCE
Qty: 1 EACH | Refills: 0 | Status: SHIPPED | OUTPATIENT
Start: 2021-10-11 | End: 2021-10-11

## 2021-10-11 NOTE — TELEPHONE ENCOUNTER
Pt really will need urine culture and analysis  [facility] [address] [facility]. Allergic to bactrim and cipro  Will send in packet of fosfomycin for patient    1. Suspected UTI  - fosfomycin tromethamine (MONUROL) 3 g PACK; Take 1 packet by mouth once for 1 dose  Dispense: 1 each; Refill: 0  - URINALYSIS WITH MICROSCOPIC; Future  - Culture, Urine;  Future

## 2021-11-22 ENCOUNTER — HOSPITAL ENCOUNTER (OUTPATIENT)
Age: 76
Discharge: HOME | End: 2021-11-22
Payer: COMMERCIAL

## 2021-11-22 ENCOUNTER — OFFICE VISIT (OUTPATIENT)
Dept: PRIMARY CARE CLINIC | Age: 76
End: 2021-11-22
Payer: COMMERCIAL

## 2021-11-22 VITALS
BODY MASS INDEX: 34.48 KG/M2 | TEMPERATURE: 97.1 F | HEART RATE: 80 BPM | HEIGHT: 62 IN | SYSTOLIC BLOOD PRESSURE: 140 MMHG | RESPIRATION RATE: 18 BRPM | OXYGEN SATURATION: 97 % | WEIGHT: 187.4 LBS | DIASTOLIC BLOOD PRESSURE: 78 MMHG

## 2021-11-22 DIAGNOSIS — H02.9 EYELID LESION: ICD-10-CM

## 2021-11-22 DIAGNOSIS — E66.09 CLASS 1 OBESITY DUE TO EXCESS CALORIES WITH SERIOUS COMORBIDITY AND BODY MASS INDEX (BMI) OF 34.0 TO 34.9 IN ADULT: ICD-10-CM

## 2021-11-22 DIAGNOSIS — H02.66 XANTHELASMA OF EYELID, BILATERAL: ICD-10-CM

## 2021-11-22 DIAGNOSIS — I10 ESSENTIAL HYPERTENSION: ICD-10-CM

## 2021-11-22 DIAGNOSIS — R73.9 HYPERGLYCEMIA: ICD-10-CM

## 2021-11-22 DIAGNOSIS — H02.63 XANTHELASMA OF EYELID, BILATERAL: ICD-10-CM

## 2021-11-22 DIAGNOSIS — I47.1 PAROXYSMAL SUPRAVENTRICULAR TACHYCARDIA (HCC): Chronic | ICD-10-CM

## 2021-11-22 DIAGNOSIS — E78.49 OTHER HYPERLIPIDEMIA: Chronic | ICD-10-CM

## 2021-11-22 DIAGNOSIS — G50.0 TRIGEMINAL NEURALGIA: ICD-10-CM

## 2021-11-22 DIAGNOSIS — R39.89 SUSPECTED UTI: ICD-10-CM

## 2021-11-22 DIAGNOSIS — H02.9 EYELID LESION: Primary | ICD-10-CM

## 2021-11-22 LAB
A/G RATIO: 1.5 (ref 1.1–2.2)
ALBUMIN SERPL-MCNC: 4.9 G/DL (ref 3.4–5)
ALP BLD-CCNC: 77 U/L (ref 40–129)
ALT SERPL-CCNC: 23 U/L (ref 10–40)
ANION GAP SERPL CALCULATED.3IONS-SCNC: 13 MMOL/L (ref 3–16)
AST SERPL-CCNC: 21 U/L (ref 15–37)
BASOPHILS ABSOLUTE: 0 K/UL (ref 0–0.2)
BASOPHILS RELATIVE PERCENT: 0.4 %
BILIRUB SERPL-MCNC: 0.3 MG/DL (ref 0–1)
BILIRUBIN URINE: NEGATIVE
BLOOD, URINE: NEGATIVE
BUN BLDV-MCNC: 14 MG/DL (ref 7–20)
CALCIUM SERPL-MCNC: 10.3 MG/DL (ref 8.3–10.6)
CHLORIDE BLD-SCNC: 104 MMOL/L (ref 99–110)
CLARITY: CLEAR
CO2: 26 MMOL/L (ref 21–32)
COLOR: YELLOW
CREAT SERPL-MCNC: 0.9 MG/DL (ref 0.6–1.2)
EOSINOPHILS ABSOLUTE: 0.2 K/UL (ref 0–0.6)
EOSINOPHILS RELATIVE PERCENT: 2 %
GFR AFRICAN AMERICAN: >60
GFR NON-AFRICAN AMERICAN: >60
GLUCOSE BLD-MCNC: 105 MG/DL (ref 70–99)
GLUCOSE URINE: NEGATIVE MG/DL
HCT VFR BLD CALC: 41.4 % (ref 36–48)
HEMOGLOBIN: 13.5 G/DL (ref 12–16)
KETONES, URINE: NEGATIVE MG/DL
LEUKOCYTE ESTERASE, URINE: ABNORMAL
LYMPHOCYTES ABSOLUTE: 2.1 K/UL (ref 1–5.1)
LYMPHOCYTES RELATIVE PERCENT: 25.6 %
MCH RBC QN AUTO: 29.6 PG (ref 26–34)
MCHC RBC AUTO-ENTMCNC: 32.5 G/DL (ref 31–36)
MCV RBC AUTO: 91 FL (ref 80–100)
MICROSCOPIC EXAMINATION: YES
MONOCYTES ABSOLUTE: 0.7 K/UL (ref 0–1.3)
MONOCYTES RELATIVE PERCENT: 8.4 %
MUCUS: ABNORMAL /LPF
NEUTROPHILS ABSOLUTE: 5.2 K/UL (ref 1.7–7.7)
NEUTROPHILS RELATIVE PERCENT: 63.6 %
NITRITE, URINE: NEGATIVE
PDW BLD-RTO: 13.5 % (ref 12.4–15.4)
PH UA: 6.5 (ref 5–8)
PLATELET # BLD: 282 K/UL (ref 135–450)
PMV BLD AUTO: 8.2 FL (ref 5–10.5)
POTASSIUM SERPL-SCNC: 5.2 MMOL/L (ref 3.5–5.1)
PROTEIN UA: NEGATIVE MG/DL
RBC # BLD: 4.54 M/UL (ref 4–5.2)
RBC UA: ABNORMAL /HPF (ref 0–4)
SODIUM BLD-SCNC: 143 MMOL/L (ref 136–145)
SPECIFIC GRAVITY UA: 1.01 (ref 1–1.03)
TOTAL PROTEIN: 8.1 G/DL (ref 6.4–8.2)
URINE TYPE: ABNORMAL
UROBILINOGEN, URINE: 0.2 E.U./DL
WBC # BLD: 8.1 K/UL (ref 4–11)
WBC UA: ABNORMAL /HPF (ref 0–5)

## 2021-11-22 PROCEDURE — 83036 HEMOGLOBIN GLYCOSYLATED A1C: CPT

## 2021-11-22 PROCEDURE — 87086 URINE CULTURE/COLONY COUNT: CPT

## 2021-11-22 PROCEDURE — G8427 DOCREV CUR MEDS BY ELIG CLIN: HCPCS | Performed by: FAMILY MEDICINE

## 2021-11-22 PROCEDURE — 85025 COMPLETE CBC W/AUTO DIFF WBC: CPT

## 2021-11-22 PROCEDURE — 82570 ASSAY OF URINE CREATININE: CPT

## 2021-11-22 PROCEDURE — 81001 URINALYSIS AUTO W/SCOPE: CPT

## 2021-11-22 PROCEDURE — 1090F PRES/ABSN URINE INCON ASSESS: CPT | Performed by: FAMILY MEDICINE

## 2021-11-22 PROCEDURE — 99214 OFFICE O/P EST MOD 30 MIN: CPT | Performed by: FAMILY MEDICINE

## 2021-11-22 PROCEDURE — 1123F ACP DISCUSS/DSCN MKR DOCD: CPT | Performed by: FAMILY MEDICINE

## 2021-11-22 PROCEDURE — G8399 PT W/DXA RESULTS DOCUMENT: HCPCS | Performed by: FAMILY MEDICINE

## 2021-11-22 PROCEDURE — G8417 CALC BMI ABV UP PARAM F/U: HCPCS | Performed by: FAMILY MEDICINE

## 2021-11-22 PROCEDURE — 82043 UR ALBUMIN QUANTITATIVE: CPT

## 2021-11-22 PROCEDURE — 36415 COLL VENOUS BLD VENIPUNCTURE: CPT

## 2021-11-22 PROCEDURE — G8484 FLU IMMUNIZE NO ADMIN: HCPCS | Performed by: FAMILY MEDICINE

## 2021-11-22 PROCEDURE — 4040F PNEUMOC VAC/ADMIN/RCVD: CPT | Performed by: FAMILY MEDICINE

## 2021-11-22 PROCEDURE — 80053 COMPREHEN METABOLIC PANEL: CPT

## 2021-11-22 PROCEDURE — 1036F TOBACCO NON-USER: CPT | Performed by: FAMILY MEDICINE

## 2021-11-22 RX ORDER — PRAVASTATIN SODIUM 40 MG
TABLET ORAL
Qty: 90 TABLET | Refills: 3 | Status: SHIPPED | OUTPATIENT
Start: 2021-11-22 | End: 2022-03-31 | Stop reason: SDUPTHER

## 2021-11-22 RX ORDER — METOPROLOL TARTRATE 50 MG/1
TABLET, FILM COATED ORAL
Qty: 180 TABLET | Refills: 3 | Status: SHIPPED | OUTPATIENT
Start: 2021-11-22 | End: 2022-03-31 | Stop reason: SDUPTHER

## 2021-11-22 RX ORDER — MECLIZINE HYDROCHLORIDE 25 MG/1
25 TABLET ORAL 3 TIMES DAILY PRN
Qty: 90 TABLET | Refills: 2 | Status: SHIPPED | OUTPATIENT
Start: 2021-11-22 | End: 2022-03-31 | Stop reason: SDUPTHER

## 2021-11-22 SDOH — ECONOMIC STABILITY: FOOD INSECURITY: WITHIN THE PAST 12 MONTHS, YOU WORRIED THAT YOUR FOOD WOULD RUN OUT BEFORE YOU GOT MONEY TO BUY MORE.: NEVER TRUE

## 2021-11-22 SDOH — ECONOMIC STABILITY: FOOD INSECURITY: WITHIN THE PAST 12 MONTHS, THE FOOD YOU BOUGHT JUST DIDN'T LAST AND YOU DIDN'T HAVE MONEY TO GET MORE.: NEVER TRUE

## 2021-11-22 ASSESSMENT — ENCOUNTER SYMPTOMS
CONSTIPATION: 0
EYE DISCHARGE: 0
EYE PAIN: 0
SHORTNESS OF BREATH: 0
SORE THROAT: 0
WHEEZING: 0
ABDOMINAL PAIN: 1
COLOR CHANGE: 0
DIARRHEA: 0
BLOOD IN STOOL: 0
RHINORRHEA: 0
BACK PAIN: 0

## 2021-11-22 ASSESSMENT — ANXIETY QUESTIONNAIRES
3. WORRYING TOO MUCH ABOUT DIFFERENT THINGS: 0
2. NOT BEING ABLE TO STOP OR CONTROL WORRYING: 0
7. FEELING AFRAID AS IF SOMETHING AWFUL MIGHT HAPPEN: 0
6. BECOMING EASILY ANNOYED OR IRRITABLE: 0
1. FEELING NERVOUS, ANXIOUS, OR ON EDGE: 0
5. BEING SO RESTLESS THAT IT IS HARD TO SIT STILL: 0
4. TROUBLE RELAXING: 0
GAD7 TOTAL SCORE: 0

## 2021-11-22 ASSESSMENT — SOCIAL DETERMINANTS OF HEALTH (SDOH): HOW HARD IS IT FOR YOU TO PAY FOR THE VERY BASICS LIKE FOOD, HOUSING, MEDICAL CARE, AND HEATING?: NOT HARD AT ALL

## 2021-11-22 ASSESSMENT — PATIENT HEALTH QUESTIONNAIRE - PHQ9
SUM OF ALL RESPONSES TO PHQ QUESTIONS 1-9: 0
SUM OF ALL RESPONSES TO PHQ9 QUESTIONS 1 & 2: 0
1. LITTLE INTEREST OR PLEASURE IN DOING THINGS: 0
SUM OF ALL RESPONSES TO PHQ QUESTIONS 1-9: 0
SUM OF ALL RESPONSES TO PHQ QUESTIONS 1-9: 0
2. FEELING DOWN, DEPRESSED OR HOPELESS: 0

## 2021-11-22 NOTE — PROGRESS NOTES
Sil Meredith     1945    Consultants:   Patient Care Team:  Anand Pelaez DO as PCP - General (Family Medicine)  Anand Pelaez DO as PCP - [facility] Empaneled Provider    Chief Complaint:     Chief Complaint   Patient presents with    6 Month Follow-Up    Hyperglycemia    Hypertension    Hyperlipidemia    Obesity    Immunizations     declines Tdap, flu, shingrix, PCV23    Other     needs AWV    Eyelid Problem       HPI      Sil Meredith is a 68 y.o. female  is an established patient who presents for 6 month follow up: eyelid lesions B/L, Hyperglycemia, HTN, HPL, Obesity:    -Ocular Complaint:  Bottom of her eyelid has lesion  Lower eyelids  White spot in the lid  No pain  Only reason she found them because she was fatigued  Had eyes checked at [address] [place] [address] but forgot to mention it to them    -Hyperglycemia:  Lab Results   Component Value Date     05/25/2021    K 4.7 05/25/2021     05/25/2021    CO2 26 05/25/2021    BUN 16 05/25/2021    CREATININE 1.0 05/25/2021    GLUCOSE 105 (H) 05/25/2021    CALCIUM 10.0 05/25/2021    PROT 8.2 05/25/2021    LABALBU 4.8 05/25/2021    BILITOT 0.3 05/25/2021    ALKPHOS 70 05/25/2021    AST 28 05/25/2021    ALT 24 05/25/2021    LABGLOM 54 (A) 05/25/2021    GFRAA >60 05/25/2021    AGRATIO 1.4 05/25/2021    GLOB 3.4 05/25/2021     Lab Results   Component Value Date    LABA1C 6.3 05/25/2021     Lab Results   Component Value Date    .1 05/25/2021       PSVT/HTN:  Not at goal  BP Readings from Last 3 Encounters:   11/22/21 (!) 140/78   05/25/21 134/86   11/11/20 138/88   Sees Dr. Arlene Smyth- has appointment 2/15/22; couldn't get in until then due to scheduling issue with the practice  Metoprolol 50 mg BID  ASA 81 mg daily    Hyperlipidemia:  Pravastatin 40 mg taking every other day otherwise could not tolerate daily    Obesity:  Body mass index is 34.28 kg/m².   Wt Readings from Last 3 Encounters:   11/22/21 187 lb 6.4 oz (85 kg)   05/25/21 192 lb 6.4 oz (87.3 kg)   11/11/20 191 lb (86.6 kg)           Patient Active Problem List   Diagnosis    Hypertension    Hyperglycemia    Vitamin D deficiency    HLD (hyperlipidemia)    Paroxysmal supraventricular tachycardia (HCC)    Arthritis    Trigeminal neuralgia    Fatty liver    Vertigo    Trigger finger, left middle finger    Numbness in both hands    Carpal tunnel syndrome on both sides    Mild intermittent asthma without complication    Obesity due to excess calories with serious comorbidity         Past Medical History:    Past Medical History:   Diagnosis Date    Arthritis     Asthma     Fatty liver     Hiatal hernia     HTN (hypertension)     Hyperlipidemia     Osteopenia     Trigeminal neuralgia     Vertigo        Past Surgical History:  Past Surgical History:   Procedure Laterality Date    CHOLECYSTECTOMY      DILATION AND CURETTAGE OF UTERUS      x2    TUBAL LIGATION         Home Meds:  Prior to Visit Medications    Medication Sig Taking? Authorizing Provider   metoprolol tartrate (LOPRESSOR) 50 MG tablet TAKE 1 TABLET TWICE A DAY Yes Judith Connell [facility]., DO   pravastatin (PRAVACHOL) 40 MG tablet TAKE 1 TABLET DAILY Yes Sonya Connell [facility]., DO   acetaminophen (TYLENOL) 650 MG suppository Place 650 mg rectally every 4 hours as needed for Fever Yes Historical Provider, MD   naproxen sodium (ALEVE) 220 MG tablet Take 220 mg by mouth 2 times daily (with meals) Yes Historical Provider, MD   meclizine (ANTIVERT) 25 MG tablet Take 1 tablet by mouth 3 times daily as needed (prn) Yes Natalie Barker MD   calcium carbonate (TUMS) 500 MG chewable tablet Take 2 tablets by mouth daily as needed. Yes Historical Provider, MD   therapeutic multivitamin-minerals (THERAGRAN-M) tablet Take 1 tablet by mouth daily. Yes Historical Provider, MD   aspirin 81 MG chewable tablet Take 81 mg by mouth daily.    Yes Historical Provider, MD       Allergies:    Latex, Amlodipine, Bactrim, Benazepril hcl, Ciprofloxacin, Clonidine derivatives, Coreg [carvedilol], Dye [iodides], Fish-derived products, Lisinopril, Lotensin [benazepril hcl], Penicillins, Sulfa antibiotics, and Vicodin [hydrocodone-acetaminophen]    Family History:       Problem Relation Age of Onset    Diabetes Mother     Heart Disease Mother     High Blood Pressure Mother     Kidney Disease Mother     Heart Attack Mother     Arthritis Father     High Blood Pressure Father     Cancer Sister     Heart Disease Brother     Heart Disease Maternal Grandmother     Cancer Paternal Aunt     Tuberculosis Maternal Uncle        Social History:   Social History     Socioeconomic History    Marital status:      Spouse name: Nacho Iglesias Number of children: 11    Years of education: 15    Highest education level: Associate degree: occupational, technical, or vocational program   Occupational History    Occupation: registered nurse     Employer: [address] [place] [address]: retired   Tobacco Use    Smoking status: Never Smoker    Smokeless tobacco: Never Used   Vaping Use    Vaping Use: Never used   Substance and Sexual Activity    Alcohol use: No    Drug use: No    Sexual activity: Yes     Partners: Male   Other Topics Concern    Not on file   Social History Narrative    Lives in [facility] [place] miles up [place], closer to [facility] than [place]    Retired for 15 years    Was RN at [employer] [facility] [employer]: Low Risk     Difficulty of Paying Living Expenses: Not hard at [address]: No Food Insecurity    Worried About [facility] in the Last Year: Never true    [facility] in the Last Year: Never true   Transportation Needs:     Lack of Transportation (Medical): Not on file    Lack of Transportation (Non-Medical):  Not on file   Physical Activity:     Days of Exercise per Week: Not on file    Minutes of Exercise per Session: Not on file   Stress:     Feeling of Stress : Not on file   Social Connections:     Frequency of Communication with Friends and Family: Not on file    Frequency of Social Gatherings with Friends and Family: Not on file    Attends Advent Services: Not on file    Active Member of Clubs or Organizations: Not on file    Attends Club or Organization Meetings: Not on file    Marital Status: Not on file   Intimate Partner Violence:     Fear of Current or Ex-Partner: Not on file    Emotionally Abused: Not on file    Physically Abused: Not on file    Sexually Abused: Not on file   Housing Stability:     Unable to Pay for Housing in the Last Year: Not on file    Number of [place] in the Last Year: Not on file    Unstable Housing in the Last Year: Not on file       Health Maintenance Completed:  Health Maintenance   Topic Date Due    DTaP/Tdap/Td vaccine (1 - Tdap) Never done    Shingles Vaccine (1 of 2) Never done    Pneumococcal 65+ years Vaccine (1 of 1 - PPSV23) Never done    Flu vaccine (1) Never done    COVID-19 Vaccine (3 - Booster for Pollard Peter series) 09/26/2021    Lipid screen  05/25/2022    DEXA (modify frequency per FRAX score)  Completed    Hepatitis C screen  Completed    Hepatitis A vaccine  Aged Out    Hepatitis B vaccine  Aged Out    Hib vaccine  Aged Out    Meningococcal (ACWY) vaccine  Aged [employer] History   Administered Date(s) Administered    COVID-19, Pollard Peter, PF, 30mcg/0.3mL 03/05/2021, 03/26/2021     Review of Systems   Constitutional: Negative for chills, fever and unexpected weight change. HENT: Negative for congestion, rhinorrhea and sore throat. Eyes: Negative for pain, discharge and visual disturbance. White inner eyelid lesion lower eyelid bilaterally   Respiratory: Negative for shortness of breath and wheezing. Cardiovascular: Negative for chest pain and leg swelling. Gastrointestinal: Positive for abdominal pain.  Negative for blood in stool, constipation and diarrhea. Endocrine: Negative for polyuria. Genitourinary: Negative for dysuria and flank pain. Musculoskeletal: Negative for arthralgias, back pain and neck pain. Skin: Negative for color change, rash and wound. Allergic/Immunologic: Negative for environmental allergies, food allergies and immunocompromised state. Neurological: Negative for dizziness, speech difficulty, weakness, light-headedness and headaches. Hematological: Negative for adenopathy. Does not bruise/bleed easily. Psychiatric/Behavioral: Negative for dysphoric mood and sleep disturbance. The patient is not nervous/anxious. Vitals:    11/22/21 1105 11/22/21 1153   BP: (!) 142/74 (!) 140/78   Site: Left Upper Arm Right Upper Arm   Position: Sitting Sitting   Cuff Size: Medium Adult Medium Adult   Pulse: 85 80   Resp: 18    Temp: 97.1 °F (36.2 °C)    TempSrc: Temporal    SpO2: 97%    Weight: 187 lb 6.4 oz (85 kg)    Height: 5' 2\" (1.575 m)      Body mass index is 34.28 kg/m². Wt Readings from Last 3 Encounters:   11/22/21 187 lb 6.4 oz (85 kg)   05/25/21 192 lb 6.4 oz (87.3 kg)   11/11/20 191 lb (86.6 kg)       BP Readings from Last 3 Encounters:   11/22/21 (!) 142/74   05/25/21 134/86   11/11/20 138/88       Physical Exam  Constitutional:       General: She is not in acute distress. Appearance: She is well-developed. HENT:      Head: Normocephalic and atraumatic. Right Ear: Tympanic membrane normal.      Left Ear: Tympanic membrane normal.      Nose: Nose normal. No rhinorrhea. Mouth/Throat:      Pharynx: Uvula midline. Eyes:      Pupils: Pupils are equal, round, and reactive to light. Comments: Lower eyelid peeled back and white lesion on lower inside eyelid bilaterally   Neck:      Trachea: No tracheal deviation. Cardiovascular:      Rate and Rhythm: Normal rate and regular rhythm. Heart sounds: Normal heart sounds. No murmur heard. No friction rub. No gallop.     Pulmonary:      Effort: Pulmonary effort is normal. No respiratory distress. Breath sounds: Normal breath sounds. No wheezing or rales. Abdominal:      General: Bowel sounds are normal. There is no distension. Palpations: Abdomen is soft. Tenderness: There is no abdominal tenderness. There is no rebound. Musculoskeletal:         General: No tenderness. Normal range of motion. Lymphadenopathy:      Cervical: No cervical adenopathy. Skin:     General: Skin is warm and dry. Findings: No erythema or rash. Neurological:      Mental Status: She is alert and oriented to person, place, and time. Cranial Nerves: No cranial nerve deficit. Psychiatric:         Speech: Speech normal.         Thought Content: Thought content does not include homicidal or suicidal ideation.         Lab Review:     Lab Results   Component Value Date    LABA1C 6.3 05/25/2021     Lab Results   Component Value Date    .1 05/25/2021     Lab Results   Component Value Date     11/22/2021    K 5.2 (H) 11/22/2021     11/22/2021    CO2 26 11/22/2021    BUN 14 11/22/2021    CREATININE 0.9 11/22/2021    GLUCOSE 105 (H) 11/22/2021    CALCIUM 10.3 11/22/2021    PROT 8.1 11/22/2021    LABALBU 4.9 11/22/2021    BILITOT 0.3 11/22/2021    ALKPHOS 77 11/22/2021    AST 21 11/22/2021    ALT 23 11/22/2021    LABGLOM >60 11/22/2021    GFRAA >60 11/22/2021    AGRATIO 1.5 11/22/2021    GLOB 3.4 05/25/2021       Lab Results   Component Value Date    CHOL 172 05/25/2021    CHOL 149 08/11/2020    CHOL 152 03/11/2019     Lab Results   Component Value Date    TRIG 202 (H) 05/25/2021    TRIG 155 (H) 08/11/2020    TRIG 174 (H) 03/11/2019     Lab Results   Component Value Date    HDL 42 05/25/2021    HDL 44 08/11/2020    HDL 46 03/11/2019     Lab Results   Component Value Date    LDLCALC 90 05/25/2021    LDLCALC 74 08/11/2020    LDLCALC 71 03/11/2019     Lab Results   Component Value Date    LABVLDL 40 05/25/2021    LABVLDL 31 08/11/2020    LABVLDL 35 03/11/2019     No results found for: CHOLHDLRCODEY  The 10-year ASCVD risk score (Iram Saleh, et al., 2013) is: 26.6%    Values used to calculate the score:      Age: 68 years      Sex: Female      Is Non- : No      Diabetic: No      Tobacco smoker: No      Systolic Blood Pressure: 671 mmHg      Is BP treated: Yes      HDL Cholesterol: 42 mg/dL      Total Cholesterol: 172 mg/dL       Assessment/Plan:  Amy Mota is 69 y/o female who was seen today for 6 month follow-up, hyperglycemia, hypertension, hyperlipidemia, obesity, immunizations, needs AWV in future, and eyelid problem. 1. Eyelid lesion  2. Xanthelasma of eyelid, bilateral  -unclear if cholesterol deposit vs epithelial lesion vs other etiology; will have ophthalmology take a look for patient  - CBC Auto Differential; Future  - AFL - Agata Álvarez MD, Ophthalmology, [facility]    3. Hyperglycemia  Had eye exam at [address]; A1C improved  Lifestyle controlled;   - HEMOGLOBIN A1C; Future  - Comprehensive Metabolic Panel; Future  - MICROALBUMIN / CREATININE URINE RATIO; Future    4. Essential hypertension  Elevated on 2 checks today; continue metoprolol at this time, titrate up 2nd agent to goal; home monitoring where controlled per patient  - metoprolol tartrate (LOPRESSOR) 50 MG tablet; TAKE 1 TABLET TWICE A DAY  Dispense: 180 tablet; Refill: 3  - Comprehensive Metabolic Panel; Future  - MICROALBUMIN / CREATININE URINE RATIO; Future    5. Paroxysmal supraventricular tachycardia (HCC)  Well controlled  - metoprolol tartrate (LOPRESSOR) 50 MG tablet; TAKE 1 TABLET TWICE A DAY  Dispense: 180 tablet; Refill: 3    6. Other hyperlipidemia  Optimized  - pravastatin (PRAVACHOL) 40 MG tablet; TAKE 1 TABLET DAILY  Dispense: 90 tablet; Refill: 3    7. Trigeminal neuralgia  - meclizine (ANTIVERT) 25 MG tablet; Take 1 tablet by mouth 3 times daily as needed (prn)  Dispense: 90 tablet; Refill: 2    8.  Class 1 obesity due to excess calories with serious comorbidity and body mass index (BMI) of 34.0 to 34.9 in adult  -Recommend 150 minutes of cardiovascular activity a week, or 10,000 to 15,000 steps a day, 2 days of weightbearing  -dietary wise, try to stick to your weight x 10 in calories a day  -avoid processed/refined carbohydrates (boxed/canned/frozen/fast)  -encourage healthy protein and fat, butter, avocado, egg    Health Maintenance Due:  Health Maintenance Due   Topic Date Due    DTaP/Tdap/Td vaccine (1 - Tdap) Never done    Shingles Vaccine (1 of 2) Never done    Pneumococcal 65+ years Vaccine (1 of 1 - PPSV23) Never done    Flu vaccine (1) Never done    COVID-19 Vaccine (3 - Booster for Pfizer series) 09/26/2021      Health Maintenance[de-identified]  Immunizations:  Declines vaccines today; willl get COVID booster  Needs AWV    Return in about 6 months (around 5/22/2022). -Chart/records reviewed, history and physical performed, health maintenance addressed and updated, presenting problems addressed. EMR Dragon/transcription disclaimer:  Much of this encounter note is electronic transcription/translation of spoken language to printed texts. The electronic translation of spoken language may be erroneous, or at times, nonsensical words or phrases may be inadvertently transcribed. Although I have reviewed the note for such errors, some may still exist.       Shaina Roberson.  Filippo Chand, DO     11/22/2021

## 2021-11-22 NOTE — PATIENT INSTRUCTIONS
Get covid booster reach out to pharmacist or [facility] ([facility])    Get blood work today    Check blood pressure both arms legs uncrossed in the morning before you've eaten and take average of 2    At home <135/85 is great  Let me know if running >140/90 consistently  Otherwise stay the course      -Recommend 150 minutes of cardiovascular activity a week, or 10,000 to 15,000 steps a day, 2 days of weightbearing  -dietary wise, try to stick to your weight x 10 in calories a day  -avoid processed/refined carbohydrates (boxed/canned/frozen/fast)  -encourage healthy protein and fat, butter, avocado, egg    Continue current medications    Follow up in 6 months, I will reach out with

## 2021-11-23 LAB
CREATININE URINE: 40.6 MG/DL (ref 28–259)
ESTIMATED AVERAGE GLUCOSE: 131.2 MG/DL
HBA1C MFR BLD: 6.2 %
MICROALBUMIN UR-MCNC: <1.2 MG/DL
MICROALBUMIN/CREAT UR-RTO: NORMAL MG/G (ref 0–30)
URINE CULTURE, ROUTINE: NORMAL

## 2022-03-31 DIAGNOSIS — I10 ESSENTIAL HYPERTENSION: ICD-10-CM

## 2022-03-31 DIAGNOSIS — I47.1 PAROXYSMAL SUPRAVENTRICULAR TACHYCARDIA (HCC): Chronic | ICD-10-CM

## 2022-03-31 DIAGNOSIS — G50.0 TRIGEMINAL NEURALGIA: ICD-10-CM

## 2022-03-31 DIAGNOSIS — E78.49 OTHER HYPERLIPIDEMIA: Chronic | ICD-10-CM

## 2022-03-31 RX ORDER — MECLIZINE HYDROCHLORIDE 25 MG/1
25 TABLET ORAL 3 TIMES DAILY PRN
Qty: 90 TABLET | Refills: 2 | Status: SHIPPED | OUTPATIENT
Start: 2022-03-31

## 2022-03-31 RX ORDER — METOPROLOL TARTRATE 50 MG/1
TABLET, FILM COATED ORAL
Qty: 180 TABLET | Refills: 3 | Status: SHIPPED | OUTPATIENT
Start: 2022-03-31

## 2022-03-31 RX ORDER — PRAVASTATIN SODIUM 40 MG
TABLET ORAL
Qty: 90 TABLET | Refills: 3 | Status: SHIPPED | OUTPATIENT
Start: 2022-03-31

## 2022-04-29 ENCOUNTER — TELEPHONE (OUTPATIENT)
Dept: PRIMARY CARE CLINIC | Age: 77
End: 2022-04-29

## 2022-04-29 NOTE — TELEPHONE ENCOUNTER
Pt called complaining of UTI symptoms - burning, small streaks of blood when she wipes, some mucus. Advised pt we have no appts today, proceed to urgent care. Pt agreed.

## 2022-07-21 ENCOUNTER — HOSPITAL ENCOUNTER (OUTPATIENT)
Age: 77
Discharge: HOME | End: 2022-07-21
Payer: COMMERCIAL

## 2022-07-21 ENCOUNTER — OFFICE VISIT (OUTPATIENT)
Dept: PRIMARY CARE CLINIC | Age: 77
End: 2022-07-21
Payer: COMMERCIAL

## 2022-07-21 VITALS
WEIGHT: 190 LBS | HEART RATE: 82 BPM | TEMPERATURE: 97.2 F | HEIGHT: 62 IN | DIASTOLIC BLOOD PRESSURE: 82 MMHG | BODY MASS INDEX: 34.96 KG/M2 | SYSTOLIC BLOOD PRESSURE: 138 MMHG | OXYGEN SATURATION: 98 %

## 2022-07-21 DIAGNOSIS — I47.1 PAROXYSMAL SUPRAVENTRICULAR TACHYCARDIA (HCC): Chronic | ICD-10-CM

## 2022-07-21 DIAGNOSIS — R73.9 HYPERGLYCEMIA: ICD-10-CM

## 2022-07-21 DIAGNOSIS — R73.03 PRE-DIABETES: ICD-10-CM

## 2022-07-21 DIAGNOSIS — Z11.3 ROUTINE SCREENING FOR STI (SEXUALLY TRANSMITTED INFECTION): ICD-10-CM

## 2022-07-21 DIAGNOSIS — N39.0 RECURRENT UTI: Primary | ICD-10-CM

## 2022-07-21 DIAGNOSIS — E78.49 OTHER HYPERLIPIDEMIA: Chronic | ICD-10-CM

## 2022-07-21 DIAGNOSIS — R35.0 URINARY FREQUENCY: ICD-10-CM

## 2022-07-21 DIAGNOSIS — I10 PRIMARY HYPERTENSION: Chronic | ICD-10-CM

## 2022-07-21 DIAGNOSIS — J45.20 MILD INTERMITTENT ASTHMA WITHOUT COMPLICATION: ICD-10-CM

## 2022-07-21 LAB
A/G RATIO: 1.6 (ref 1.1–2.2)
ALBUMIN SERPL-MCNC: 4.7 G/DL (ref 3.4–5)
ALP BLD-CCNC: 81 U/L (ref 40–129)
ALT SERPL-CCNC: 25 U/L (ref 10–40)
ANION GAP SERPL CALCULATED.3IONS-SCNC: 14 MMOL/L (ref 3–16)
AST SERPL-CCNC: 27 U/L (ref 15–37)
BACTERIA: ABNORMAL /HPF
BILIRUB SERPL-MCNC: 0.3 MG/DL (ref 0–1)
BILIRUBIN URINE: NEGATIVE
BLOOD, URINE: ABNORMAL
BUN BLDV-MCNC: 16 MG/DL (ref 7–20)
CALCIUM SERPL-MCNC: 9.7 MG/DL (ref 8.3–10.6)
CHLORIDE BLD-SCNC: 104 MMOL/L (ref 99–110)
CHOLESTEROL, TOTAL: 173 MG/DL (ref 0–199)
CLARITY: CLEAR
CO2: 25 MMOL/L (ref 21–32)
COLOR: YELLOW
CREAT SERPL-MCNC: 1 MG/DL (ref 0.6–1.2)
EPITHELIAL CELLS, UA: ABNORMAL /HPF (ref 0–5)
GFR AFRICAN AMERICAN: >60
GFR NON-AFRICAN AMERICAN: 54
GLUCOSE BLD-MCNC: 107 MG/DL (ref 70–99)
GLUCOSE URINE: NEGATIVE MG/DL
HDLC SERPL-MCNC: 44 MG/DL (ref 40–60)
KETONES, URINE: NEGATIVE MG/DL
LDL CHOLESTEROL CALCULATED: 91 MG/DL
LEUKOCYTE ESTERASE, URINE: ABNORMAL
MICROSCOPIC EXAMINATION: YES
NITRITE, URINE: NEGATIVE
PH UA: 6 (ref 5–8)
POTASSIUM SERPL-SCNC: 4.9 MMOL/L (ref 3.5–5.1)
PROTEIN UA: NEGATIVE MG/DL
RBC UA: ABNORMAL /HPF (ref 0–4)
RENAL EPITHELIAL, UA: ABNORMAL /HPF (ref 0–1)
SODIUM BLD-SCNC: 143 MMOL/L (ref 136–145)
SPECIFIC GRAVITY UA: <=1.005 (ref 1–1.03)
TOTAL PROTEIN: 7.6 G/DL (ref 6.4–8.2)
TRIGL SERPL-MCNC: 188 MG/DL (ref 0–150)
URINE TYPE: ABNORMAL
UROBILINOGEN, URINE: 0.2 E.U./DL
VLDLC SERPL CALC-MCNC: 38 MG/DL
WBC UA: ABNORMAL /HPF (ref 0–5)

## 2022-07-21 PROCEDURE — 1036F TOBACCO NON-USER: CPT | Performed by: FAMILY MEDICINE

## 2022-07-21 PROCEDURE — G8417 CALC BMI ABV UP PARAM F/U: HCPCS | Performed by: FAMILY MEDICINE

## 2022-07-21 PROCEDURE — 1123F ACP DISCUSS/DSCN MKR DOCD: CPT | Performed by: FAMILY MEDICINE

## 2022-07-21 PROCEDURE — 80061 LIPID PANEL: CPT

## 2022-07-21 PROCEDURE — 87186 SC STD MICRODIL/AGAR DIL: CPT

## 2022-07-21 PROCEDURE — 36415 COLL VENOUS BLD VENIPUNCTURE: CPT

## 2022-07-21 PROCEDURE — 86780 TREPONEMA PALLIDUM: CPT

## 2022-07-21 PROCEDURE — G8399 PT W/DXA RESULTS DOCUMENT: HCPCS | Performed by: FAMILY MEDICINE

## 2022-07-21 PROCEDURE — 87390 HIV-1 AG IA: CPT

## 2022-07-21 PROCEDURE — 87077 CULTURE AEROBIC IDENTIFY: CPT

## 2022-07-21 PROCEDURE — 80053 COMPREHEN METABOLIC PANEL: CPT

## 2022-07-21 PROCEDURE — 83036 HEMOGLOBIN GLYCOSYLATED A1C: CPT

## 2022-07-21 PROCEDURE — 87591 N.GONORRHOEAE DNA AMP PROB: CPT

## 2022-07-21 PROCEDURE — 87086 URINE CULTURE/COLONY COUNT: CPT

## 2022-07-21 PROCEDURE — G8427 DOCREV CUR MEDS BY ELIG CLIN: HCPCS | Performed by: FAMILY MEDICINE

## 2022-07-21 PROCEDURE — 81001 URINALYSIS AUTO W/SCOPE: CPT

## 2022-07-21 PROCEDURE — 86701 HIV-1ANTIBODY: CPT

## 2022-07-21 PROCEDURE — 1090F PRES/ABSN URINE INCON ASSESS: CPT | Performed by: FAMILY MEDICINE

## 2022-07-21 PROCEDURE — 87491 CHLMYD TRACH DNA AMP PROBE: CPT

## 2022-07-21 PROCEDURE — 99214 OFFICE O/P EST MOD 30 MIN: CPT | Performed by: FAMILY MEDICINE

## 2022-07-21 PROCEDURE — 86702 HIV-2 ANTIBODY: CPT

## 2022-07-21 ASSESSMENT — PATIENT HEALTH QUESTIONNAIRE - PHQ9
6. FEELING BAD ABOUT YOURSELF - OR THAT YOU ARE A FAILURE OR HAVE LET YOURSELF OR YOUR FAMILY DOWN: 0
4. FEELING TIRED OR HAVING LITTLE ENERGY: 1
10. IF YOU CHECKED OFF ANY PROBLEMS, HOW DIFFICULT HAVE THESE PROBLEMS MADE IT FOR YOU TO DO YOUR WORK, TAKE CARE OF THINGS AT HOME, OR GET ALONG WITH OTHER PEOPLE: 0
9. THOUGHTS THAT YOU WOULD BE BETTER OFF DEAD, OR OF HURTING YOURSELF: 0
7. TROUBLE CONCENTRATING ON THINGS, SUCH AS READING THE NEWSPAPER OR WATCHING TELEVISION: 0
SUM OF ALL RESPONSES TO PHQ QUESTIONS 1-9: 2
5. POOR APPETITE OR OVEREATING: 0
3. TROUBLE FALLING OR STAYING ASLEEP: 1
SUM OF ALL RESPONSES TO PHQ QUESTIONS 1-9: 2
1. LITTLE INTEREST OR PLEASURE IN DOING THINGS: 0
SUM OF ALL RESPONSES TO PHQ QUESTIONS 1-9: 2
2. FEELING DOWN, DEPRESSED OR HOPELESS: 0
SUM OF ALL RESPONSES TO PHQ QUESTIONS 1-9: 2
8. MOVING OR SPEAKING SO SLOWLY THAT OTHER PEOPLE COULD HAVE NOTICED. OR THE OPPOSITE, BEING SO FIGETY OR RESTLESS THAT YOU HAVE BEEN MOVING AROUND A LOT MORE THAN USUAL: 0
SUM OF ALL RESPONSES TO PHQ9 QUESTIONS 1 & 2: 0

## 2022-07-21 ASSESSMENT — ANXIETY QUESTIONNAIRES
1. FEELING NERVOUS, ANXIOUS, OR ON EDGE: 0
4. TROUBLE RELAXING: 0
GAD7 TOTAL SCORE: 0
5. BEING SO RESTLESS THAT IT IS HARD TO SIT STILL: 0
3. WORRYING TOO MUCH ABOUT DIFFERENT THINGS: 0
2. NOT BEING ABLE TO STOP OR CONTROL WORRYING: 0
IF YOU CHECKED OFF ANY PROBLEMS ON THIS QUESTIONNAIRE, HOW DIFFICULT HAVE THESE PROBLEMS MADE IT FOR YOU TO DO YOUR WORK, TAKE CARE OF THINGS AT HOME, OR GET ALONG WITH OTHER PEOPLE: NOT DIFFICULT AT ALL
6. BECOMING EASILY ANNOYED OR IRRITABLE: 0
7. FEELING AFRAID AS IF SOMETHING AWFUL MIGHT HAPPEN: 0

## 2022-07-21 ASSESSMENT — ENCOUNTER SYMPTOMS
TROUBLE SWALLOWING: 0
BLOOD IN STOOL: 0
RHINORRHEA: 0
DIARRHEA: 0
CONSTIPATION: 0
SHORTNESS OF BREATH: 0
ABDOMINAL PAIN: 0

## 2022-07-21 NOTE — PATIENT INSTRUCTIONS
Get blood work and urine studies today    Consider setting up with urology    Continue current medications    Consider Pneumonia, shingles, tdap, and covid 4th dose/2nd booster vaccines    -Recommend 150 minutes of cardiovascular activity a week, or 10,000 to 15,000 steps a day, 2 days of weightbearing  -dietary wise, try to stick to your weight x 10 in calories a day  -avoid processed/refined carbohydrates (boxed/canned/frozen/fast)  -encourage healthy protein and fat, butter, avocado, egg

## 2022-07-21 NOTE — PROGRESS NOTES
Martha Estrada  1945    Consultants:   Patient Care Team:  Shiela Bloch. Collette Connor., DO as PCP - General (Family Medicine)  Shiela Bloch. Collette Connor., DO as PCP - [facility] Empaneled Provider    Chief Complaint:     Chief Complaint   Patient presents with    Follow-up     Recurrent uti, frequency, urgency, incontinence, htn, hpl, PSVT, health maintenance      Urinary Frequency    Urinary Tract Infection    Incontinence    Hypertension    Tachycardia    Hyperlipidemia    Health Maintenance    Immunizations     Declines PCV20, Tdap, Shingrix, and COVID 2nd booster/4th dose today       HPI:  Martha Estrada is a 68 y.o. female  is an established patient who presents for 8 month follow up of PSVT, HTN, HPL, hyperglycemia, urinary issues, vertigo, health maintenance including DEXA scan needed, need for vaccinations:    Urinary frequency:  Past 2 or 3 years  Had UTI about 9 months ago  Treated with azithromycin and pyridium  Thinks but she doesn't know that COVID 19 vaccine might have given her a UTI  Started having flank pain after the UTI and would come around  Would come around and come and go  After that is when she started getting bladder infection  Happened again after 3rd dose/first booster 12/11/2022 per patient  States she had urethral dilation about 40 years ago, Dr. Rodman Holstein did this    -HTN/PSVT:  Sees Dr. Jennifer Celestin at [facility] annually, last OV 3/11/2022  Taking Metoprolol 50 mg BID  Home -130/60's  BP Readings from Last 3 Encounters:   07/21/22 138/82   11/22/21 (!) 140/78   05/25/21 134/86       Hyperglycemia:  Lab Results   Component Value Date     11/22/2021    K 5.2 (H) 11/22/2021     11/22/2021    CO2 26 11/22/2021    BUN 14 11/22/2021    CREATININE 0.9 11/22/2021    GLUCOSE 105 (H) 11/22/2021    CALCIUM 10.3 11/22/2021    PROT 8.1 11/22/2021    LABALBU 4.9 11/22/2021    BILITOT 0.3 11/22/2021    ALKPHOS 77 11/22/2021    AST 21 11/22/2021    ALT 23 11/22/2021    LABGLOM >60 11/22/2021 GFRAA >60 11/22/2021    AGRATIO 1.5 11/22/2021    GLOB 3.4 05/25/2021       Lab Results   Component Value Date    LABA1C 6.2 11/22/2021     Lab Results   Component Value Date    .2 11/22/2021       Hyperlipidemia:  Taking Pravastatin 40 mg daily  Lab Results   Component Value Date    CHOL 172 05/25/2021    CHOL 149 08/11/2020    CHOL 152 03/11/2019     Lab Results   Component Value Date    TRIG 202 (H) 05/25/2021    TRIG 155 (H) 08/11/2020    TRIG 174 (H) 03/11/2019     Lab Results   Component Value Date    HDL 42 05/25/2021    HDL 44 08/11/2020    HDL 46 03/11/2019     Lab Results   Component Value Date    LDLCALC 90 05/25/2021    [address] 08/11/2020    LDLCALC 71 03/11/2019     Lab Results   Component Value Date    LABVLDL 40 05/25/2021    LABVLDL 31 08/11/2020    LABVLDL 35 03/11/2019     No results found for: CHOLHDLRATIO    BPPV:  Meclizine 25 mg TID PRN  Had to take some this morning  Over past 5 days having more dizziness and headaches    Taking multivitamin, TUMS PRN, ASA 81 mg daily    Health maintenance:  Due for COVID 4th dose/2nd booster, PCV20, Shingrix, Tdap- declines today  Defers DEXA scan today    Patient Active Problem List   Diagnosis    Hypertension    Hyperglycemia    Vitamin D deficiency    HLD (hyperlipidemia)    Paroxysmal supraventricular tachycardia (HCC)    Arthritis    Trigeminal neuralgia    Fatty liver    Vertigo    Trigger finger, left middle finger    Numbness in both hands    Carpal tunnel syndrome on both sides    Mild intermittent asthma without complication    Obesity due to excess calories with serious comorbidity         Past Medical History:    Past Medical History:   Diagnosis Date    Arthritis     Asthma     Fatty liver     Hiatal hernia     HTN (hypertension)     Hyperlipidemia     Osteopenia     Trigeminal neuralgia     Vertigo        Past Surgical History:  Past Surgical History:   Procedure Laterality Date    CHOLECYSTECTOMY      DILATION AND CURETTAGE OF UTERUS x2    TUBAL LIGATION         Home Meds:  Prior to Visit Medications    Medication Sig Taking? Authorizing Provider   meclizine (ANTIVERT) 25 MG tablet Take 1 tablet by mouth 3 times daily as needed (prn) Yes Florence Drone. Anjelica Vasquez., DO   metoprolol tartrate (LOPRESSOR) 50 MG tablet TAKE 1 TABLET TWICE A DAY Yes Neyda Rudd, DO   pravastatin (PRAVACHOL) 40 MG tablet TAKE 1 TABLET DAILY Yes Florence Drone. Anjelica Rudd, DO   acetaminophen (TYLENOL) 650 MG suppository Place 650 mg rectally every 4 hours as needed for Fever Yes Historical Provider, MD   naproxen sodium (ANAPROX) 220 MG tablet Take 220 mg by mouth 2 times daily (with meals) Yes Historical Provider, MD   calcium carbonate (TUMS) 500 MG chewable tablet Take 2 tablets by mouth daily as needed. Yes Historical Provider, MD   therapeutic multivitamin-minerals (THERAGRAN-M) tablet Take 1 tablet by mouth daily. Yes Historical Provider, MD   aspirin 81 MG chewable tablet Take 81 mg by mouth daily.    Yes Historical Provider, MD       Allergies:    Latex, Amlodipine, Bactrim, Benazepril hcl, Ciprofloxacin, Clonidine derivatives, Coreg [carvedilol], Dye [iodides], Fish-derived products, Lisinopril, Lotensin [benazepril hcl], Penicillins, Sulfa antibiotics, and Vicodin [hydrocodone-acetaminophen]    Family History:       Problem Relation Age of Onset    Diabetes Mother     Heart Disease Mother     High Blood Pressure Mother     Kidney Disease Mother     Heart Attack Mother     Arthritis Father     High Blood Pressure Father     Cancer Sister     Heart Disease Brother     Heart Disease Maternal Grandmother     Cancer Paternal Aunt     Tuberculosis Maternal Uncle        Social History:   Social History     Socioeconomic History    Marital status:      Spouse name: Andrew Vargas    Number of children: 5    Years of education: 14    Highest education level: Associate degree: occupational, technical, or vocational program   Occupational History    Occupation: registered nurse     Employer: [employer]     Comment: retired   Tobacco Use    Smoking status: Never    Smokeless tobacco: Never   Vaping Use    Vaping Use: Never used   Substance and Sexual Activity    Alcohol use: No    Drug use: No    Sexual activity: Yes     Partners: Male   Other Topics Concern    Not on file   Social History Narrative    Lives in [place] [address] miles up [place], closer to [facility] than [place]    Retired for 15 years    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[facility] [employer]: 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     Difficulty of Paying Living Expenses: Not hard at [facility] Insecurity: No Food Insecurity    Worried About [address] in the Last Year: Never true    [address] in the Last Year: Never true   Transportation Needs: Not on file   Physical Activity: Not on file   Stress: Not on file   Social Connections: Not on file   Intimate Partner Violence: Not on file   Housing Stability: Not on file       Health Maintenance Completed:  Health Maintenance   Topic Date Due    Pneumococcal 65+ years Vaccine (1 - PCV) Never done    DTaP/Tdap/Td vaccine (1 - Tdap) Never done    Shingles vaccine (1 of 2) Never done    COVID-19 Vaccine (4 - Booster for Pfizer series) 04/11/2022    Lipids  05/25/2022    Flu vaccine (1) 09/01/2022    Depression Screen  11/22/2022    DEXA (modify frequency per FRAX score)  Completed    Hepatitis C screen  Completed    Hepatitis A vaccine  Aged Out    Hepatitis B vaccine  Aged Out    Hib vaccine  Aged Out    Meningococcal (ACWY) vaccine  Aged Out          Immunization History   Administered Date(s) Administered    COVID-19, PFIZER PURPLE top, DILUTE for use, (age 15 y+), 30mcg/0.3mL 03/05/2021, 03/26/2021, 12/11/2021       Review of Systems   Constitutional:  Negative for chills, fever and unexpected weight change. HENT:  Negative for congestion, rhinorrhea and trouble swallowing. Eyes:  Negative for visual disturbance. Respiratory:  Negative for shortness of breath. Cardiovascular:  Negative for chest pain and leg swelling. Gastrointestinal:  Negative for abdominal pain, blood in stool, constipation and diarrhea. Endocrine: Positive for polyuria. Genitourinary:  Positive for frequency. Negative for difficulty urinating, dysuria, genital sores, hematuria, pelvic pain and vaginal discharge. Musculoskeletal:  Negative for arthralgias. Skin:  Negative for rash. Neurological:  Positive for dizziness. Negative for syncope, weakness, light-headedness, numbness and headaches. Psychiatric/Behavioral:  Negative for dysphoric mood and sleep disturbance. The patient is not nervous/anxious and is not hyperactive. Vitals:    07/21/22 0940   BP: 138/82   Site: Left Upper Arm   Position: Sitting   Cuff Size: Medium Adult   Pulse: 82   Temp: 97.2 °F (36.2 °C)   TempSrc: Temporal   SpO2: 98%   Weight: 190 lb (86.2 kg)   Height: 5' 2\" (1.575 m)     Body mass index is 34.75 kg/m². Wt Readings from Last 3 Encounters:   07/21/22 190 lb (86.2 kg)   11/22/21 187 lb 6.4 oz (85 kg)   05/25/21 192 lb 6.4 oz (87.3 kg)       BP Readings from Last 3 Encounters:   07/21/22 138/82   11/22/21 (!) 140/78   05/25/21 134/86       Physical Exam  Constitutional:       General: She is not in acute distress. Appearance: She is well-developed. HENT:      Head: Normocephalic and atraumatic. Right Ear: Tympanic membrane normal.      Left Ear: Tympanic membrane normal.      Nose: Nose normal. No rhinorrhea. Mouth/Throat:      Pharynx: Uvula midline. Eyes:      Pupils: Pupils are equal, round, and reactive to light. Neck:      Trachea: No tracheal deviation. Cardiovascular:      Rate and Rhythm: Normal rate and regular rhythm. Heart sounds: Normal heart sounds. No murmur heard. No friction rub. No gallop. Pulmonary:      Effort: Pulmonary effort is normal. No respiratory distress. Breath sounds: Normal breath sounds. No wheezing or rales.    Abdominal: Platelets 78/24/4857 282     MPV 11/22/2021 8.2     Neutrophils % 11/22/2021 63.6     Lymphocytes % 11/22/2021 25.6     Monocytes % 11/22/2021 8.4     Eosinophils % 11/22/2021 2.0     Basophils % 11/22/2021 0.4     Neutrophils Absolute 11/22/2021 5.2     Lymphocytes Absolute 11/22/2021 2.1     Monocytes Absolute 11/22/2021 0.7     Eosinophils Absolute 11/22/2021 0.2     Basophils Absolute 11/22/2021 0.0     Urine Culture, Routine 11/22/2021 No growth at 18 to 36 hours     Color, UA 11/22/2021 Yellow     Clarity, UA 11/22/2021 Clear     Glucose, Ur 11/22/2021 Negative     Bilirubin Urine 11/22/2021 Negative     Ketones, Urine 11/22/2021 Negative     Specific Gravity, UA 11/22/2021 1.010     Blood, Urine 11/22/2021 Negative     pH, UA 11/22/2021 6.5     Protein, UA 11/22/2021 Negative     Urobilinogen, Urine 11/22/2021 0.2     Nitrite, Urine 11/22/2021 Negative     Leukocyte Esterase, Urine 11/22/2021 SMALL (A)    Microscopic Examination 11/22/2021 YES     Urine Type 11/22/2021 NotGiven     Mucus, UA 11/22/2021 1+ (A)    WBC, UA 11/22/2021 None seen     RBC, UA 11/22/2021 0-2        Component Ref Range & Units 10:52   (7/21/22) 8 mo ago   (11/22/21) 8 mo ago   (11/22/21)   Color, UA Straw/Yellow Yellow   Yellow    Clarity, UA Clear Clear   Clear    Glucose, Ur Negative mg/dL Negative   Negative    Bilirubin Urine Negative Negative   Negative    Ketones, Urine Negative mg/dL Negative   Negative    Specific Gravity, UA 1.005 - 1.030 <=1.005   1.010    Blood, Urine Negative TRACE-INTACT Abnormal    Negative    pH, UA 5.0 - 8.0 6.0   6.5    Protein, UA Negative mg/dL Negative   Negative    Urobilinogen, Urine <2.0 E.U./dL 0.2   0.2    Nitrite, Urine Negative Negative   Negative    Leukocyte Esterase, Urine Negative SMALL Abnormal    SMALL Abnormal     Microscopic Examination  YES   YES    Urine Type  NotGiven   NotGiven CM    WBC, UA 0 - 5 /HPF 21-50 Abnormal   None seen     RBC, UA 0 - 4 /HPF 0-2  0-2     Epithelial Cells, UA 0 - 5 /HPF 11-20 Abnormal       Renal Epithelial, UA 0 - 1 /HPF 2-5 Abnormal       Bacteria, UA None Seen /HPF 1+ Abnormal         Assessment/Plan:  Altagracia Eduardo is 69 y/o female who was seen today for follow-up, urinary frequency, urinary tract infection, incontinence, hypertension, tachycardia, hyperlipidemia, health maintenance and immunizations. 1. Recurrent UTI  2. Urinary frequency  -unclear etiology; had urethral dilation years ago  Blood, leuks, nites on UA and proteus and e coli on cx  Treated with 7 day course of bactrim, sensitivities  Recommend follow up with urology for hematuria and recurrence, may need further evaluation/intervention  - Urinalysis with Microscopic; Future  - Culture, Urine; Future  - AFL - Dave Sanchez MD, Urology, [facility]    3. Paroxysmal supraventricular tachycardia ([address]  4. Primary hypertension   At goal  Continue Lopressor 50 mg BID    5. Hyperglycemia  6. Pre-diabetes  Repeat glucose testing today based on a1c of prediabetes in past 12 months  - Comprehensive Metabolic Panel; Future  - Hemoglobin A1C; Future    7. Other hyperlipidemia  - Lipid Panel; Future    8. Mild intermittent asthma without complication  -well controlled, no longer having to use albuterol    9. Routine screening for STI (sexually transmitted infection)  Pt concerned with recent UTI that having STI  -STI screening today    - TREPONEMA PALLIDUM (SYPHILIS) ANTIBODY; Future  - HIV Screen; Future  - C.trachomatis N.gonorrhoeae DNA, Urine; Future  Health Maintenance Due:  Health Maintenance Due   Topic Date Due    Pneumococcal 65+ years Vaccine (1 - PCV) Never done    DTaP/Tdap/Td vaccine (1 - Tdap) Never done    Shingles vaccine (1 of 2) Never done    COVID-19 Vaccine (4 - Booster for Pfizer series) 04/11/2022    Lipids  05/25/2022      Health Maintenance:  Declines DEXA and vaccines    Return if symptoms worsen or fail to improve.      Spent 30-39 minutes of face to face interaction with patient counseling on diagnoses and treatment plan; including but not limited to pre visit planning, chart/lab review, new orders, instructions, charting    EMR Dragon/transcription disclaimer:  Much of this encounter note is electronic transcription/translation of spoken language to printed texts. The electronic translation of spoken language may be erroneous, or at times, nonsensical words or phrases may be inadvertently transcribed. Although I have reviewed the note for such errors, some may still exist.       Chery Stanford.  Daryl Wells., DO

## 2022-07-22 LAB
C. TRACHOMATIS DNA ,URINE: NEGATIVE
ESTIMATED AVERAGE GLUCOSE: 134.1 MG/DL
HBA1C MFR BLD: 6.3 %
HIV AG/AB: NORMAL
HIV ANTIGEN: NORMAL
HIV-1 ANTIBODY: NORMAL
HIV-2 AB: NORMAL
N. GONORRHOEAE DNA, URINE: NEGATIVE

## 2022-07-23 LAB
ORGANISM: ABNORMAL
ORGANISM: ABNORMAL
T PALLIDUM ANTIBODIES (TP-PA): NON REACTIVE
URINE CULTURE, ROUTINE: ABNORMAL
URINE CULTURE, ROUTINE: ABNORMAL

## 2022-07-25 DIAGNOSIS — B96.20 E. COLI UTI: Primary | ICD-10-CM

## 2022-07-25 DIAGNOSIS — N30.90 CYSTITIS: Primary | ICD-10-CM

## 2022-07-25 DIAGNOSIS — N39.0 E. COLI UTI: Primary | ICD-10-CM

## 2022-07-25 RX ORDER — CEPHALEXIN 500 MG/1
500 CAPSULE ORAL 2 TIMES DAILY
Qty: 14 CAPSULE | Refills: 0 | Status: SHIPPED | OUTPATIENT
Start: 2022-07-25 | End: 2022-08-01

## 2022-07-25 NOTE — PROGRESS NOTES
1. Cystitis  - cephALEXin (KEFLEX) 500 MG capsule; Take 1 capsule by mouth in the morning and 1 capsule before bedtime. Do all this for 7 days. Dispense: 14 capsule;  Refill: 0

## 2023-01-25 ENCOUNTER — OFFICE VISIT (OUTPATIENT)
Dept: FAMILY MEDICINE CLINIC | Age: 78
End: 2023-01-25

## 2023-01-25 VITALS
SYSTOLIC BLOOD PRESSURE: 170 MMHG | DIASTOLIC BLOOD PRESSURE: 90 MMHG | WEIGHT: 188.2 LBS | BODY MASS INDEX: 34.42 KG/M2 | HEART RATE: 82 BPM | OXYGEN SATURATION: 97 %

## 2023-01-25 DIAGNOSIS — N18.31 STAGE 3A CHRONIC KIDNEY DISEASE (HCC): ICD-10-CM

## 2023-01-25 DIAGNOSIS — E78.2 MIXED HYPERLIPIDEMIA: Primary | Chronic | ICD-10-CM

## 2023-01-25 DIAGNOSIS — R42 VERTIGO: ICD-10-CM

## 2023-01-25 DIAGNOSIS — I10 ESSENTIAL HYPERTENSION: ICD-10-CM

## 2023-01-25 DIAGNOSIS — E78.49 OTHER HYPERLIPIDEMIA: Chronic | ICD-10-CM

## 2023-01-25 DIAGNOSIS — I47.1 PAROXYSMAL SUPRAVENTRICULAR TACHYCARDIA (HCC): ICD-10-CM

## 2023-01-25 DIAGNOSIS — G50.0 TRIGEMINAL NEURALGIA: ICD-10-CM

## 2023-01-25 DIAGNOSIS — R73.03 PREDIABETES: ICD-10-CM

## 2023-01-25 LAB
ANION GAP SERPL CALCULATED.3IONS-SCNC: 14 MMOL/L (ref 3–16)
BUN BLDV-MCNC: 17 MG/DL (ref 7–20)
CALCIUM SERPL-MCNC: 10.4 MG/DL (ref 8.3–10.6)
CHLORIDE BLD-SCNC: 102 MMOL/L (ref 99–110)
CO2: 25 MMOL/L (ref 21–32)
CREAT SERPL-MCNC: 0.9 MG/DL (ref 0.6–1.2)
CREATININE URINE: 37.8 MG/DL (ref 28–259)
GFR SERPL CREATININE-BSD FRML MDRD: >60 ML/MIN/{1.73_M2}
GLUCOSE BLD-MCNC: 103 MG/DL (ref 70–99)
MICROALBUMIN UR-MCNC: 1.8 MG/DL
MICROALBUMIN/CREAT UR-RTO: 47.6 MG/G (ref 0–30)
POTASSIUM SERPL-SCNC: 4.4 MMOL/L (ref 3.5–5.1)
SODIUM BLD-SCNC: 141 MMOL/L (ref 136–145)

## 2023-01-25 RX ORDER — PRAVASTATIN SODIUM 40 MG
TABLET ORAL
Qty: 90 TABLET | Refills: 3 | Status: SHIPPED | OUTPATIENT
Start: 2023-01-25

## 2023-01-25 RX ORDER — METOPROLOL TARTRATE 50 MG/1
TABLET, FILM COATED ORAL
Qty: 180 TABLET | Refills: 3 | Status: SHIPPED | OUTPATIENT
Start: 2023-01-25

## 2023-01-25 RX ORDER — MECLIZINE HYDROCHLORIDE 25 MG/1
25 TABLET ORAL 3 TIMES DAILY PRN
Qty: 90 TABLET | Refills: 2 | Status: SHIPPED | OUTPATIENT
Start: 2023-01-25

## 2023-01-25 ASSESSMENT — PATIENT HEALTH QUESTIONNAIRE - PHQ9
SUM OF ALL RESPONSES TO PHQ QUESTIONS 1-9: 0
SUM OF ALL RESPONSES TO PHQ QUESTIONS 1-9: 0
2. FEELING DOWN, DEPRESSED OR HOPELESS: 0
SUM OF ALL RESPONSES TO PHQ QUESTIONS 1-9: 0
SUM OF ALL RESPONSES TO PHQ9 QUESTIONS 1 & 2: 0
SUM OF ALL RESPONSES TO PHQ QUESTIONS 1-9: 0
1. LITTLE INTEREST OR PLEASURE IN DOING THINGS: 0

## 2023-01-25 NOTE — PROGRESS NOTES
2023    Minda Long (:  1945) is a 68 y.o. female, here for evaluation of the following medical concerns:  Chief Complaint   Patient presents with    New Patient       HPI    Hypertension/paroxysmal supraventricular tachycardia  Follows with Dr. Gildardo Penn at [facility]  On metoprolol 50 mg twice daily  Checks Bp at home typically in 130's/70-80's. Hyperlipidemia  On pravastatin 40 mg every other day    Recurrent UTIs  Has had urethral dilation about 40 years ago  Has not had recurrence in last 6 months    BPPV  Meclizine 25 mg 3 times daily as needed  Reports that this has been ongoing since she was 17. Has seen 2 ENTs and had MRI which were unremarkable. Typically lasts 1-3 days. Able to perform epley maneuver on her own which helps intermittently    Migraines/headaches  Occur 1 every couple of months. Prediabetes  Last A1c 6.3   Has been consistently 6.4-6.2 for the last 4 years  Tries to limit sugary snacks. Living in home with   Walking 2-3 x a week    Review of Systems  Hearing is good  Follows with optometry yearly for glasses    All other systems reviewed and negative    Prior to Visit Medications    Medication Sig Taking? Authorizing Provider   metoprolol tartrate (LOPRESSOR) 50 MG tablet TAKE 1 TABLET TWICE A DAY Yes Funmi Michele, DO   pravastatin (PRAVACHOL) 40 MG tablet TAKE 1 TABLET DAILY Yes Funmi Michele DO   meclizine (ANTIVERT) 25 MG tablet Take 1 tablet by mouth 3 times daily as needed (prn) Yes Funmi Michele,    calcium carbonate (TUMS) 500 MG chewable tablet Take 2 tablets by mouth daily as needed. Yes Historical Provider, MD   therapeutic multivitamin-minerals (THERAGRAN-M) tablet Take 1 tablet by mouth daily. Yes Historical Provider, MD   aspirin 81 MG chewable tablet Take 81 mg by mouth daily.    Yes Historical Provider, MD        Allergies   Allergen Reactions    Latex     Amlodipine      Red rash all over      Bactrim     Benazepril Hcl Ciprofloxacin      DIZZY    Clonidine Derivatives      Dropped BP to low      Coreg [Carvedilol]     Dye [Iodides]     Fish-Derived Products     Lisinopril      CAUSED PT FACE TO GET RED AND BURN LIKE FIRE    Lotensin [Benazepril Hcl]      Red rash      Penicillins     Sulfa Antibiotics     Vicodin [Hydrocodone-Acetaminophen]      Dizzy         Past Medical History:   Diagnosis Date    Arthritis     Asthma     Fatty liver     Hiatal hernia     HTN (hypertension)     Hyperlipidemia     Osteopenia     Trigeminal neuralgia     Vertigo        Past Surgical History:   Procedure Laterality Date    CHOLECYSTECTOMY      DILATION AND CURETTAGE OF UTERUS      x2    TUBAL LIGATION         Social History     Socioeconomic History    Marital status:      Spouse name: Jamari Rosario    Number of children: 5    Years of education: 14    Highest education level: Associate degree: occupational, technical, or vocational program   Occupational History    Occupation: registered nurse     Employer: [employer]     Comment: retired   Tobacco Use    Smoking status: Never    Smokeless tobacco: Never   Vaping Use    Vaping Use: Never used   Substance and Sexual Activity    Alcohol use: No    Drug use: No    Sexual activity: Yes     Partners: Male   Other Topics Concern    Not on file   Social History Narrative    Lives in [facility] [address] up [place], closer to [facility] than [place]    Retired for 15 years    Was RN at [employer] Strain: Not on [employer] Insecurity: Not on file   Transportation Needs: Not on file   Physical Activity: Not on file   Stress: Not on file   Social Connections: Not on file   Intimate Partner Violence: Not on file   Housing Stability: Not on file        Family History   Problem Relation Age of Onset    Diabetes Mother     Heart Disease Mother     High Blood Pressure Mother     Kidney Disease Mother     Heart Attack Mother     Arthritis Father     High Blood Pressure Father     Cancer Sister     Heart Disease Brother     Heart Disease Maternal Grandmother     Cancer Paternal Aunt     Tuberculosis Maternal Uncle        Vitals:    01/25/23 1055 01/25/23 1138   BP: (!) 172/92 (!) 170/90   Site: Right Upper Arm Left Upper Arm   Position: Sitting Sitting   Cuff Size: Medium Adult Medium Adult   Pulse: 82    SpO2: 97%    Weight: 188 lb 3.2 oz (85.4 kg)      Estimated body mass index is 34.42 kg/m² as calculated from the following:    Height as of 7/21/22: 5' 2\" (1.575 m). Weight as of this encounter: 188 lb 3.2 oz (85.4 kg). Physical Exam  Vitals reviewed. Constitutional:       Appearance: Normal appearance. HENT:      Head: Normocephalic and atraumatic. Cardiovascular:      Rate and Rhythm: Normal rate and regular rhythm. Pulmonary:      Effort: Pulmonary effort is normal.      Breath sounds: Normal breath sounds. Neurological:      General: No focal deficit present. Mental Status: She is alert and oriented to person, place, and time. Psychiatric:         Behavior: Behavior normal.         Thought Content: Thought content normal.       ASSESSMENT/PLAN:  1. Paroxysmal supraventricular tachycardia ([address]  Following with Dr. Nunu Porter at [facility]. Rate controlled on metoprolol without significant AE   - metoprolol tartrate (LOPRESSOR) 50 MG tablet; TAKE 1 TABLET TWICE A DAY  Dispense: 180 tablet; Refill: 3    2. Mixed hyperlipidemia  Last lipid panel 6 months ago and at goal except for triglycerides 188. On Pravachol 40mg    3. Vertigo  Reports long standing diagnosis. Has had multiple evaluations previously and able to mostly manage on her own with epley maneuver at home and meclizine. 4. Stage 3a chronic kidney disease ([address]  Noted on prior lab work. Recheck BMP  - Basic Metabolic Panel; Future    5. Prediabetes  Recheck. Last A1c 6.3.  Recommended monitoring diet to try and avoid medication.   - Hemoglobin A1C; Future  - MICROALBUMIN / CREATININE URINE RATIO; Future    6. Essential hypertension  Elevated in office today but patietn reports checking regularly at home. May have not taken medication this AM. Will monitor and if continued levation then will adjust medication  - metoprolol tartrate (LOPRESSOR) 50 MG tablet; TAKE 1 TABLET TWICE A DAY  Dispense: 180 tablet; Refill: 3    7. Other hyperlipidemia  - pravastatin (PRAVACHOL) 40 MG tablet; TAKE 1 TABLET DAILY  Dispense: 90 tablet; Refill: 3    8. Trigeminal neuralgia  - meclizine (ANTIVERT) 25 MG tablet; Take 1 tablet by mouth 3 times daily as needed (prn)  Dispense: 90 tablet; Refill: 2    No follow-ups on file. An  electronic signature was used to authenticate this note.     --Janine La DO on 1/29/2023 at 10:18 AM

## 2023-01-26 LAB
ESTIMATED AVERAGE GLUCOSE: 131.2 MG/DL
HBA1C MFR BLD: 6.2 %

## 2023-08-02 ENCOUNTER — OFFICE VISIT (OUTPATIENT)
Dept: FAMILY MEDICINE CLINIC | Age: 78
End: 2023-08-02

## 2023-08-02 VITALS
HEART RATE: 74 BPM | BODY MASS INDEX: 34.41 KG/M2 | WEIGHT: 187 LBS | HEIGHT: 62 IN | DIASTOLIC BLOOD PRESSURE: 84 MMHG | SYSTOLIC BLOOD PRESSURE: 132 MMHG | OXYGEN SATURATION: 98 %

## 2023-08-02 DIAGNOSIS — R80.9 MICROALBUMINURIA: ICD-10-CM

## 2023-08-02 DIAGNOSIS — R53.83 FATIGUE, UNSPECIFIED TYPE: ICD-10-CM

## 2023-08-02 DIAGNOSIS — R73.03 PREDIABETES: ICD-10-CM

## 2023-08-02 DIAGNOSIS — E78.2 MIXED HYPERLIPIDEMIA: Chronic | ICD-10-CM

## 2023-08-02 DIAGNOSIS — R30.0 DYSURIA: ICD-10-CM

## 2023-08-02 DIAGNOSIS — I47.1 PAROXYSMAL SUPRAVENTRICULAR TACHYCARDIA (HCC): Primary | Chronic | ICD-10-CM

## 2023-08-02 LAB
BILIRUBIN, POC: NEGATIVE
BLOOD URINE, POC: NORMAL
CLARITY, POC: CLEAR
COLOR, POC: YELLOW
CREATININE URINE POCT: NORMAL
DEPRECATED RDW RBC AUTO: 14 % (ref 12.4–15.4)
GLUCOSE URINE, POC: NEGATIVE
HBA1C MFR BLD: 6 %
HCT VFR BLD AUTO: 38.3 % (ref 36–48)
HGB BLD-MCNC: 13.2 G/DL (ref 12–16)
KETONES, POC: NEGATIVE
LEUKOCYTE EST, POC: NORMAL
MCH RBC QN AUTO: 31.4 PG (ref 26–34)
MCHC RBC AUTO-ENTMCNC: 34.5 G/DL (ref 31–36)
MCV RBC AUTO: 90.8 FL (ref 80–100)
MICROALBUMIN/CREAT 24H UR: NORMAL MG/G{CREAT}
MICROALBUMIN/CREAT UR-RTO: NORMAL
NITRITE, POC: NEGATIVE
PH, POC: 6
PLATELET # BLD AUTO: 238 K/UL (ref 135–450)
PMV BLD AUTO: 8.3 FL (ref 5–10.5)
PROTEIN, POC: NEGATIVE
RBC # BLD AUTO: 4.22 M/UL (ref 4–5.2)
SPECIFIC GRAVITY, POC: 1.02
TSH SERPL DL<=0.005 MIU/L-ACNC: 1.32 UIU/ML (ref 0.27–4.2)
UROBILINOGEN, POC: 0.2
WBC # BLD AUTO: 6.2 K/UL (ref 4–11)

## 2023-08-02 NOTE — PROGRESS NOTES
Patient: Shyann Kiran is a 66 y.o. female who presents today with the following Chief Complaint(s):  Chief Complaint   Patient presents with    6 Month Follow-Up         HPI    Hypertension/paroxysmal supraventricular tachycardia  Follows with [facility] Farhana Escalante at [facility]  On metoprolol 50 mg twice daily  Checks Bp at home typically in 130's/70-80's. Hyperlipidemia  On pravastatin 40 mg every other day     Recurrent UTIs  Has had urethral dilation about 40 years ago  Has not had recurrence in last 6 months  Frequency and dysuria last week. Has improved now     BPPV  Meclizine 25 mg 3 times daily as needed  Reports that this has been ongoing since she was 17. Has seen 2 ENTs and had MRI which were unremarkable. Typically lasts 1-3 days. Able to perform epley maneuver on her own which helps intermittently     Migraines/headaches  Occur 1 every couple of months. Prediabetes  Last A1c 6.3   Has been consistently 6.4-6.2 for the last 4 years  Tries to limit sugary snacks. Living in home with   Walking 2-3 x a week    Current Outpatient Medications   Medication Sig Dispense Refill    metoprolol tartrate (LOPRESSOR) 50 MG tablet TAKE 1 TABLET TWICE A  tablet 3    pravastatin (PRAVACHOL) 40 MG tablet TAKE 1 TABLET DAILY 90 tablet 3    meclizine (ANTIVERT) 25 MG tablet Take 1 tablet by mouth 3 times daily as needed (prn) 90 tablet 2    calcium carbonate (TUMS) 500 MG chewable tablet Take 2 tablets by mouth daily as needed      therapeutic multivitamin-minerals (THERAGRAN-M) tablet Take 1 tablet by mouth daily      aspirin 81 MG chewable tablet Take 1 tablet by mouth daily       No current facility-administered medications for this visit. Patient's past medical history, surgical history, family history, medications,  andallergies  were all reviewed and updated as appropriate today. Review of Systems  All other systems reviewed and negative    Physical Exam  Vitals reviewed.

## 2023-08-03 LAB
ANION GAP SERPL CALCULATED.3IONS-SCNC: 17 MMOL/L (ref 3–16)
BUN SERPL-MCNC: 17 MG/DL (ref 7–20)
CALCIUM SERPL-MCNC: 10 MG/DL (ref 8.3–10.6)
CHLORIDE SERPL-SCNC: 103 MMOL/L (ref 99–110)
CHOLEST SERPL-MCNC: 179 MG/DL (ref 0–199)
CO2 SERPL-SCNC: 22 MMOL/L (ref 21–32)
CREAT SERPL-MCNC: 1 MG/DL (ref 0.6–1.2)
GFR SERPLBLD CREATININE-BSD FMLA CKD-EPI: 58 ML/MIN/{1.73_M2}
GLUCOSE SERPL-MCNC: 109 MG/DL (ref 70–99)
HDLC SERPL-MCNC: 43 MG/DL (ref 40–60)
LDLC SERPL CALC-MCNC: 96 MG/DL
POTASSIUM SERPL-SCNC: 4.4 MMOL/L (ref 3.5–5.1)
SODIUM SERPL-SCNC: 142 MMOL/L (ref 136–145)
TRIGL SERPL-MCNC: 199 MG/DL (ref 0–150)
VLDLC SERPL CALC-MCNC: 40 MG/DL

## 2023-08-04 LAB
BACTERIA UR CULT: ABNORMAL
ORGANISM: ABNORMAL

## 2024-02-21 ENCOUNTER — OFFICE VISIT (OUTPATIENT)
Dept: FAMILY MEDICINE CLINIC | Age: 79
End: 2024-02-21

## 2024-02-21 VITALS
HEIGHT: 62 IN | DIASTOLIC BLOOD PRESSURE: 86 MMHG | BODY MASS INDEX: 34.04 KG/M2 | WEIGHT: 185 LBS | OXYGEN SATURATION: 96 % | SYSTOLIC BLOOD PRESSURE: 128 MMHG | HEART RATE: 86 BPM

## 2024-02-21 DIAGNOSIS — E11.22 TYPE 2 DIABETES MELLITUS WITH STAGE 3A CHRONIC KIDNEY DISEASE, WITHOUT LONG-TERM CURRENT USE OF INSULIN (HCC): Primary | ICD-10-CM

## 2024-02-21 DIAGNOSIS — N18.31 TYPE 2 DIABETES MELLITUS WITH STAGE 3A CHRONIC KIDNEY DISEASE, WITHOUT LONG-TERM CURRENT USE OF INSULIN (HCC): Primary | ICD-10-CM

## 2024-02-21 DIAGNOSIS — I15.2 HYPERTENSION ASSOCIATED WITH TYPE 2 DIABETES MELLITUS (HCC): ICD-10-CM

## 2024-02-21 DIAGNOSIS — E78.5 HYPERLIPIDEMIA ASSOCIATED WITH TYPE 2 DIABETES MELLITUS (HCC): ICD-10-CM

## 2024-02-21 DIAGNOSIS — E11.59 HYPERTENSION ASSOCIATED WITH TYPE 2 DIABETES MELLITUS (HCC): ICD-10-CM

## 2024-02-21 DIAGNOSIS — E11.69 HYPERLIPIDEMIA ASSOCIATED WITH TYPE 2 DIABETES MELLITUS (HCC): ICD-10-CM

## 2024-02-21 DIAGNOSIS — N18.31 STAGE 3A CHRONIC KIDNEY DISEASE (HCC): ICD-10-CM

## 2024-02-21 LAB
ANION GAP SERPL CALCULATED.3IONS-SCNC: 10 MMOL/L (ref 3–16)
BUN SERPL-MCNC: 16 MG/DL (ref 7–20)
CALCIUM SERPL-MCNC: 10 MG/DL (ref 8.3–10.6)
CHLORIDE SERPL-SCNC: 102 MMOL/L (ref 99–110)
CO2 SERPL-SCNC: 28 MMOL/L (ref 21–32)
CREAT SERPL-MCNC: 1 MG/DL (ref 0.6–1.2)
GFR SERPLBLD CREATININE-BSD FMLA CKD-EPI: 57 ML/MIN/{1.73_M2}
GLUCOSE SERPL-MCNC: 105 MG/DL (ref 70–99)
HBA1C MFR BLD: 7.5 %
POTASSIUM SERPL-SCNC: 4.7 MMOL/L (ref 3.5–5.1)
SODIUM SERPL-SCNC: 140 MMOL/L (ref 136–145)

## 2024-02-21 SDOH — ECONOMIC STABILITY: HOUSING INSECURITY
IN THE LAST 12 MONTHS, WAS THERE A TIME WHEN YOU DID NOT HAVE A STEADY PLACE TO SLEEP OR SLEPT IN A SHELTER (INCLUDING NOW)?: NO

## 2024-02-21 SDOH — ECONOMIC STABILITY: INCOME INSECURITY: HOW HARD IS IT FOR YOU TO PAY FOR THE VERY BASICS LIKE FOOD, HOUSING, MEDICAL CARE, AND HEATING?: NOT HARD AT ALL

## 2024-02-21 SDOH — ECONOMIC STABILITY: FOOD INSECURITY: WITHIN THE PAST 12 MONTHS, YOU WORRIED THAT YOUR FOOD WOULD RUN OUT BEFORE YOU GOT MONEY TO BUY MORE.: NEVER TRUE

## 2024-02-21 SDOH — ECONOMIC STABILITY: FOOD INSECURITY: WITHIN THE PAST 12 MONTHS, THE FOOD YOU BOUGHT JUST DIDN'T LAST AND YOU DIDN'T HAVE MONEY TO GET MORE.: NEVER TRUE

## 2024-02-21 ASSESSMENT — PATIENT HEALTH QUESTIONNAIRE - PHQ9: DEPRESSION UNABLE TO ASSESS: PT REFUSES

## 2024-02-21 NOTE — PROGRESS NOTES
Assessment:  Encounter Diagnoses   Name Primary?    Stage 3a chronic kidney disease (HCC)     Type 2 diabetes mellitus with stage 3a chronic kidney disease, without long-term current use of insulin (HCC) Yes    Hypertension associated with type 2 diabetes mellitus (HCC)     Hyperlipidemia associated with type 2 diabetes mellitus (HCC)        Plan:  1. Type 2 diabetes mellitus with stage 3a chronic kidney disease, without long-term current use of insulin (HCC)  -     POCT glycosylated hemoglobin (Hb A1C)  2. Stage 3a chronic kidney disease (HCC)  -     Basic Metabolic Panel; Future  3. Hypertension associated with type 2 diabetes mellitus (HCC)  4. Hyperlipidemia associated with type 2 diabetes mellitus (HCC)  A1c worsened from 6.0-7.5 today.  Discussed new diagnosis of diabetes mellitus.  Already on statin medication.  Recommended starting metformin however patient wishes to try diet modification alone with recheck in 3 months.      Return in about 3 months (around 5/21/2024) for diabetes.      Patient: Hilda Farley is a 78 y.o. female who presents today with the following Chief Complaint(s):  Chief Complaint   Patient presents with    6 Month Follow-Up         HPI      Hypertension/paroxysmal supraventricular tachycardia  Follows with Dr. Slater at [facility]  On metoprolol 50 mg twice daily  Checks Bp at home typically in 130's/70-80's.      Hyperlipidemia  On pravastatin 40 mg every other day     Recurrent UTIs  Has had urethral dilation about 40 years ago  Has not had recurrence in last 6 months    BPPV  Meclizine 25 mg 3 times daily as needed  Reports that this has been ongoing since she was 17. Has seen 2 ENTs and had MRI which were unremarkable. Typically lasts 1-3 days. Able to perform epley maneuver on her own which helps intermittently     Migraines/headaches  Occur 1 every couple of months.      Prediabetes  Last A1c 6.0   Has been consistently 6.4-6.2 for the last 4 years  Tries to limit sugary  Diana Gomez is being seen at the request of Dr. Susan Squires MD   for consultation due to dysphagia, diarrhea. ASSESSMENT:  32year old adult presents with dysphagia, diarrhea    1. Dysphagia, symptoms mainly 2 breads certain solids. Seen to consider possible esophageal stricture, eosinophilic esophagitis. 2. Reflux, currently just on H2 blocker. 3. Diarrhea, has improved with lactose free diet. However still persisting. 4. Left lower quadrant pain, so she with diarrhea. Consider possible irritable bowel syndrome  5. ADHD  6. Anxiety/depression      PLAN:  1. Continue with Levsin as needed for abdominal pain  2. Omeprazole daily  3. Plan for EGD and colonoscopy with mac sedation. Patient scheduled for Friday 01/07/2022 at 7:30 a.m. Colan Sullivan Pre COVID test scheduled for Wednesday 01/05/2022 at 3:30 p.m. HISTORY OF PRESENT ILLNESS: Diana Gomez is a  32year old adult who presents with dysphagia   Has had problems with dysphagia to solids for years. Worse with breads, has had bad reflux. On famotidine. Has had diarrhea last year in 10/2020, still persistent. Has BM about once a day, can be watery at times. Once or twice a week with diarrhea. Has some abdominal pain, left lower abdomen . No change with bowel movement. NO blood in stools   Tried lactose free, with some improvement.        Past Medical History:   Diagnosis Date   â¢ ADHD    â¢ Anxiety    â¢ Depressive disorder    â¢ GERD (gastroesophageal reflux disease)    â¢ OCD (obsessive compulsive disorder)    â¢ PCOS (polycystic ovarian syndrome)    â¢ PTSD (post-traumatic stress disorder)    â¢ PTSD (post-traumatic stress disorder)    â¢ RAD (reactive airway disease)    â¢ Seizures (CMS/HCC)     psychogenic        Past Surgical History:   Procedure Laterality Date   â¢ Cholecystectomy         Current Outpatient Medications   Medication   â¢ drospirenone-ethinyl estradiol (SELVIN) 3-0.02 MG per tablet   â¢ hyoscyamine (LEVSIN) 0.125 MG tablet   â¢ famotidine (PEPCID) 20 MG tablet   â¢ traZODone (DESYREL) 100 MG tablet   â¢ prazosin (MINIPRESS) 5 MG capsule   â¢ Vyvanse 50 MG capsule   â¢ DULoxetine (CYMBALTA) 60 MG capsule   â¢ DULoxetine (CYMBALTA) 60 MG capsule   â¢ Acetylcysteine 600 MG Cap   â¢ Brexpiprazole (Rexulti) 1 MG Tab   â¢ Inositol Niacinate 750 MG Cap   â¢ Biotin 39138 MCG Tab     No current facility-administered medications for this visit. ALLERGIES:   Allergen Reactions   â¢ Lactose Intolerance   (Food Or Med) GI UPSET   â¢ Seasonal Cough, HEADACHES and Runny Nose   â¢ Dust Mite Extract Runny Nose and HIVES     Itchy, watery eyes     â¢ Lamotrigine RASH   â¢ Nickel RASH       Family History   Problem Relation Age of Onset   â¢ Asthma Mother    â¢ Depression Mother    â¢ Gastrointestinal Mother    â¢ Hypertension Mother    â¢ Depression Father    â¢ Hypertension Father    â¢ Alcohol Abuse Father    â¢ Diabetes Maternal Grandmother    â¢ Cancer Paternal Grandmother         liver cancer   â¢ Cancer Paternal Grandfather         lung cancer       Social History     Tobacco Use   â¢ Smoking status: Never Smoker   â¢ Smokeless tobacco: Never Used   Substance Use Topics   â¢ Alcohol use: Yes     Comment: social    â¢ Drug use: Never       Review of Systems:  Constitutional: No weight loss, no fever, no chills. Respiratory: No shortness of breath or cough. Cardiovascular: No chest pain. GastrointestinalI: Per History of Present Illness. Physical exam:  Visit Vitals  /82 (BP Location: RUE - Right upper extremity, Patient Position: Sitting, Cuff Size: Large Adult)   Pulse 88   Wt 102.5 kg (226 lb)   LMP 10/23/2021 (Exact Date)   BMI 36.48 kg/mÂ²     General: General appearance without acute distress. Skin: No jaundice on inspection. Eyes: Normal conjunctivae and lids on inspection. Pulmonary: Clear to auscultation bilaterally, good respiratory effort. Cardiovascular: Regular rate  Abdomen: Positive bowel sound, soft,  no distention, non-tender.   Psych: Mood and affect were appropriate. Judgment and insight appeared appropriate.       Labs:  Component      Latest Ref Rng & Units 10/27/2021   WBC      4.2 - 11.0 K/mcL 9.8   RBC      4.00 - 5.20 mil/mcL 4.65   HGB      12.0 - 15.5 g/dL 13.2   HCT      36.0 - 46.5 % 39.6   MCV      78.0 - 100.0 fl 85.2   MCH      26.0 - 34.0 pg 28.4   MCHC      32.0 - 36.5 g/dL 33.3   RDW-CV      11.0 - 15.0 % 12.3   RDW-SD      39.0 - 50.0 fL 37.4 (L)   PLT      140 - 450 K/mcL 324   NRBC      <=0 /100 WBC 0   Neutrophil      % 62   LYMPH      % 30   MONO      % 6   EOSIN      % 1   BASO      % 1   Immature Granulocytes      % 0   Absolute Neutrophil      1.8 - 7.7 K/mcL 6.1   Absolute Lymph      1.0 - 4.8 K/mcL 2.9   Absolute Mono      0.3 - 0.9 K/mcL 0.6   Absolute Eos      0.0 - 0.5 K/mcL 0.1   Absolute Baso      0.0 - 0.3 K/mcL 0.1   Absolute Immature Granulocytes      0.0 - 0.2 K/mcL 0.0   Sodium      135 - 145 mmol/L 136   Potassium      3.4 - 5.1 mmol/L 4.1   Chloride      98 - 107 mmol/L 105   CO2      21 - 32 mmol/L 21   ANION GAP      10 - 20 mmol/L 14   Glucose      70 - 99 mg/dL 95   BUN      6 - 20 mg/dL 8   Creatinine      0.51 - 0.95 mg/dL 0.77   Glomerular Filtration Rate      >=60 >90   BUN/CREATININE RATIO      7 - 25 10   CALCIUM      8.4 - 10.2 mg/dL 9.9   TOTAL BILIRUBIN      0.2 - 1.0 mg/dL 0.3   AST/SGOT      <=37 Units/L 30   ALT/SGPT      <64 Units/L 61   ALK PHOSPHATASE      45 - 117 Units/L 73   Albumin      3.6 - 5.1 g/dL 3.8   TOTAL PROTEIN      6.4 - 8.2 g/dL 7.8   GLOBULIN      2.0 - 4.0 g/dL 4.0   A/G Ratio, Serum      1.0 - 2.4 1.0   Gliadin IGA      <20 Units 2   Gliadin Antibody IgG, Deamidated      <20 UNITS 2   Lipase      73 - 393 Units/L 95   TSH      0.350 - 5.000 mcUnits/mL 1.532   MAGNESIUM      1.7 - 2.4 mg/dL 2.0   IGA      82 - 453 mg/dL 123       Synopsis of selected recent endoscopies, reports below if available  â¢

## 2024-05-22 ENCOUNTER — OFFICE VISIT (OUTPATIENT)
Dept: FAMILY MEDICINE CLINIC | Age: 79
End: 2024-05-22

## 2024-05-22 VITALS
BODY MASS INDEX: 33.43 KG/M2 | DIASTOLIC BLOOD PRESSURE: 82 MMHG | OXYGEN SATURATION: 96 % | SYSTOLIC BLOOD PRESSURE: 130 MMHG | WEIGHT: 182.8 LBS | HEART RATE: 84 BPM

## 2024-05-22 DIAGNOSIS — I47.10 PAROXYSMAL SUPRAVENTRICULAR TACHYCARDIA (HCC): Chronic | ICD-10-CM

## 2024-05-22 DIAGNOSIS — E55.9 VITAMIN D DEFICIENCY: Chronic | ICD-10-CM

## 2024-05-22 DIAGNOSIS — E11.59 HYPERTENSION ASSOCIATED WITH TYPE 2 DIABETES MELLITUS (HCC): ICD-10-CM

## 2024-05-22 DIAGNOSIS — I15.2 HYPERTENSION ASSOCIATED WITH TYPE 2 DIABETES MELLITUS (HCC): ICD-10-CM

## 2024-05-22 DIAGNOSIS — E11.69 HYPERLIPIDEMIA ASSOCIATED WITH TYPE 2 DIABETES MELLITUS (HCC): Primary | ICD-10-CM

## 2024-05-22 DIAGNOSIS — E78.5 HYPERLIPIDEMIA ASSOCIATED WITH TYPE 2 DIABETES MELLITUS (HCC): Primary | ICD-10-CM

## 2024-05-22 DIAGNOSIS — N18.31 TYPE 2 DIABETES MELLITUS WITH STAGE 3A CHRONIC KIDNEY DISEASE, WITHOUT LONG-TERM CURRENT USE OF INSULIN (HCC): ICD-10-CM

## 2024-05-22 DIAGNOSIS — E11.22 TYPE 2 DIABETES MELLITUS WITH STAGE 3A CHRONIC KIDNEY DISEASE, WITHOUT LONG-TERM CURRENT USE OF INSULIN (HCC): ICD-10-CM

## 2024-05-22 LAB — HBA1C MFR BLD: 6.2 %

## 2024-05-22 ASSESSMENT — PATIENT HEALTH QUESTIONNAIRE - PHQ9
SUM OF ALL RESPONSES TO PHQ QUESTIONS 1-9: 0
SUM OF ALL RESPONSES TO PHQ9 QUESTIONS 1 & 2: 0
1. LITTLE INTEREST OR PLEASURE IN DOING THINGS: NOT AT ALL
2. FEELING DOWN, DEPRESSED OR HOPELESS: NOT AT ALL

## 2024-05-22 NOTE — PROGRESS NOTES
Able to perform epley maneuver on her own which helps intermittently     Migraines/headaches  Occur 1 every couple of months.      Living in home with   Walking 2-3 x a week  Current Outpatient Medications   Medication Sig Dispense Refill    pravastatin (PRAVACHOL) 40 MG tablet TAKE 1 TABLET BY MOUTH DAILY 90 tablet 3    metoprolol tartrate (LOPRESSOR) 50 MG tablet TAKE 1 TABLET BY MOUTH TWICE  DAILY 180 tablet 3    meclizine (ANTIVERT) 25 MG tablet Take 1 tablet by mouth 3 times daily as needed (prn) 90 tablet 2    calcium carbonate (TUMS) 500 MG chewable tablet Take 2 tablets by mouth daily as needed      therapeutic multivitamin-minerals (THERAGRAN-M) tablet Take 1 tablet by mouth daily      aspirin 81 MG chewable tablet Take 1 tablet by mouth daily       No current facility-administered medications for this visit.       Patient's past medical history, surgical history, family history, medications,  andallergies  were all reviewed and updated as appropriate today.      Review of Systems  All other systems reviewed and negative    Physical Exam  Vitals:    05/22/24 1009   BP: 130/82   Pulse: 84   SpO2: 96%       Please note that portions of this note may have been completed with a voice recognition program. Efforts were made to edit the dictations but occasionally words are mis-transcribed.

## 2024-08-22 ENCOUNTER — OFFICE VISIT (OUTPATIENT)
Dept: FAMILY MEDICINE CLINIC | Age: 79
End: 2024-08-22

## 2024-08-22 VITALS
BODY MASS INDEX: 33.68 KG/M2 | WEIGHT: 183 LBS | SYSTOLIC BLOOD PRESSURE: 116 MMHG | RESPIRATION RATE: 16 BRPM | HEIGHT: 62 IN | DIASTOLIC BLOOD PRESSURE: 70 MMHG | HEART RATE: 85 BPM

## 2024-08-22 VITALS
HEART RATE: 85 BPM | HEIGHT: 62 IN | DIASTOLIC BLOOD PRESSURE: 70 MMHG | WEIGHT: 183.4 LBS | OXYGEN SATURATION: 97 % | BODY MASS INDEX: 33.75 KG/M2 | SYSTOLIC BLOOD PRESSURE: 116 MMHG

## 2024-08-22 DIAGNOSIS — E78.5 HYPERLIPIDEMIA ASSOCIATED WITH TYPE 2 DIABETES MELLITUS (HCC): ICD-10-CM

## 2024-08-22 DIAGNOSIS — Z00.00 INITIAL MEDICARE ANNUAL WELLNESS VISIT: Primary | ICD-10-CM

## 2024-08-22 DIAGNOSIS — E78.5 HYPERLIPIDEMIA ASSOCIATED WITH TYPE 2 DIABETES MELLITUS (HCC): Primary | ICD-10-CM

## 2024-08-22 DIAGNOSIS — N18.31 TYPE 2 DIABETES MELLITUS WITH STAGE 3A CHRONIC KIDNEY DISEASE, WITHOUT LONG-TERM CURRENT USE OF INSULIN (HCC): ICD-10-CM

## 2024-08-22 DIAGNOSIS — E11.69 HYPERLIPIDEMIA ASSOCIATED WITH TYPE 2 DIABETES MELLITUS (HCC): Primary | ICD-10-CM

## 2024-08-22 DIAGNOSIS — E11.59 HYPERTENSION ASSOCIATED WITH TYPE 2 DIABETES MELLITUS (HCC): ICD-10-CM

## 2024-08-22 DIAGNOSIS — I15.2 HYPERTENSION ASSOCIATED WITH TYPE 2 DIABETES MELLITUS (HCC): ICD-10-CM

## 2024-08-22 DIAGNOSIS — E11.69 HYPERLIPIDEMIA ASSOCIATED WITH TYPE 2 DIABETES MELLITUS (HCC): ICD-10-CM

## 2024-08-22 DIAGNOSIS — E55.9 VITAMIN D DEFICIENCY: Chronic | ICD-10-CM

## 2024-08-22 DIAGNOSIS — E11.22 TYPE 2 DIABETES MELLITUS WITH STAGE 3A CHRONIC KIDNEY DISEASE, WITHOUT LONG-TERM CURRENT USE OF INSULIN (HCC): ICD-10-CM

## 2024-08-22 DIAGNOSIS — I47.10 PAROXYSMAL SUPRAVENTRICULAR TACHYCARDIA (HCC): Chronic | ICD-10-CM

## 2024-08-22 LAB
25(OH)D3 SERPL-MCNC: 42.6 NG/ML
ALBUMIN SERPL-MCNC: 4.7 G/DL (ref 3.4–5)
ALBUMIN/GLOB SERPL: 1.6 {RATIO} (ref 1.1–2.2)
ALP SERPL-CCNC: 79 U/L (ref 40–129)
ALT SERPL-CCNC: 22 U/L (ref 10–40)
ANION GAP SERPL CALCULATED.3IONS-SCNC: 13 MMOL/L (ref 3–16)
AST SERPL-CCNC: 20 U/L (ref 15–37)
BILIRUB SERPL-MCNC: <0.2 MG/DL (ref 0–1)
BUN SERPL-MCNC: 18 MG/DL (ref 7–20)
CALCIUM SERPL-MCNC: 9.6 MG/DL (ref 8.3–10.6)
CHLORIDE SERPL-SCNC: 104 MMOL/L (ref 99–110)
CHOLEST SERPL-MCNC: 149 MG/DL (ref 0–199)
CO2 SERPL-SCNC: 26 MMOL/L (ref 21–32)
CREAT SERPL-MCNC: 0.8 MG/DL (ref 0.6–1.2)
DEPRECATED RDW RBC AUTO: 13.8 % (ref 12.4–15.4)
GFR SERPLBLD CREATININE-BSD FMLA CKD-EPI: 75 ML/MIN/{1.73_M2}
GLUCOSE SERPL-MCNC: 93 MG/DL (ref 70–99)
HBA1C MFR BLD: 6.4 %
HCT VFR BLD AUTO: 41 % (ref 36–48)
HDLC SERPL-MCNC: 41 MG/DL (ref 40–60)
HGB BLD-MCNC: 13.7 G/DL (ref 12–16)
LDLC SERPL CALC-MCNC: 78 MG/DL
MCH RBC QN AUTO: 30.3 PG (ref 26–34)
MCHC RBC AUTO-ENTMCNC: 33.4 G/DL (ref 31–36)
MCV RBC AUTO: 90.7 FL (ref 80–100)
PLATELET # BLD AUTO: 231 K/UL (ref 135–450)
PMV BLD AUTO: 8.1 FL (ref 5–10.5)
POTASSIUM SERPL-SCNC: 4.5 MMOL/L (ref 3.5–5.1)
PROT SERPL-MCNC: 7.6 G/DL (ref 6.4–8.2)
RBC # BLD AUTO: 4.51 M/UL (ref 4–5.2)
SODIUM SERPL-SCNC: 143 MMOL/L (ref 136–145)
TRIGL SERPL-MCNC: 149 MG/DL (ref 0–150)
VLDLC SERPL CALC-MCNC: 30 MG/DL
WBC # BLD AUTO: 6.8 K/UL (ref 4–11)

## 2024-08-22 ASSESSMENT — PATIENT HEALTH QUESTIONNAIRE - PHQ9
SUM OF ALL RESPONSES TO PHQ9 QUESTIONS 1 & 2: 0
SUM OF ALL RESPONSES TO PHQ QUESTIONS 1-9: 0
SUM OF ALL RESPONSES TO PHQ QUESTIONS 1-9: 0
1. LITTLE INTEREST OR PLEASURE IN DOING THINGS: NOT AT ALL
2. FEELING DOWN, DEPRESSED OR HOPELESS: NOT AT ALL
SUM OF ALL RESPONSES TO PHQ QUESTIONS 1-9: 0
SUM OF ALL RESPONSES TO PHQ QUESTIONS 1-9: 0

## 2024-08-22 ASSESSMENT — LIFESTYLE VARIABLES
HOW OFTEN DO YOU HAVE A DRINK CONTAINING ALCOHOL: NEVER
HOW MANY STANDARD DRINKS CONTAINING ALCOHOL DO YOU HAVE ON A TYPICAL DAY: PATIENT DOES NOT DRINK

## 2024-08-22 NOTE — PROGRESS NOTES
Patient: Hilda Farley is a 79 y.o. female who presents today with the following Chief Complaint(s):  Chief Complaint   Patient presents with    Diabetes         HPI    Dm2  A1c improved 7.5 to 6.2 at last visit  Has not been tracking carbs and sugars as closely for the last 3 months  Previously trying to target a 300 carb or less diet  On statin     Hypertension/paroxysmal supraventricular tachycardia  Follows with Dr. Slater at [facility]  On metoprolol 50 mg twice daily  Checks Bp at home typically in 130's/70-80's.      Hyperlipidemia  On pravastatin 40 mg every other day     Recurrent UTIs  Has had urethral dilation about 40 years ago  Has not had recurrence in last 6 months  Frequency and dysuria last week. Has improved now     BPPV  Meclizine 25 mg 3 times daily as needed  Reports that this has been ongoing since she was 17. Has seen 2 ENTs and had MRI which were unremarkable. Typically lasts 1-3 days. Able to perform epley maneuver on her own which helps intermittently     Migraines/headaches  Occur 1 every couple of months.      Living in home with   Walking 2-3 x a week         Current Outpatient Medications   Medication Sig Dispense Refill    pravastatin (PRAVACHOL) 40 MG tablet TAKE 1 TABLET BY MOUTH DAILY 90 tablet 3    metoprolol tartrate (LOPRESSOR) 50 MG tablet TAKE 1 TABLET BY MOUTH TWICE  DAILY 180 tablet 3    meclizine (ANTIVERT) 25 MG tablet Take 1 tablet by mouth 3 times daily as needed (prn) 90 tablet 2    calcium carbonate (TUMS) 500 MG chewable tablet Take 2 tablets by mouth daily as needed      therapeutic multivitamin-minerals (THERAGRAN-M) tablet Take 1 tablet by mouth daily      aspirin 81 MG chewable tablet Take 1 tablet by mouth daily       No current facility-administered medications for this visit.       Patient's past medical history, surgical history, family history, medications,  andallergies  were all reviewed and updated as appropriate today.      Review of Systems  All

## 2024-08-22 NOTE — PATIENT INSTRUCTIONS
years to screen for glaucoma; cataracts, macular degeneration, and other eye disorders.  A preventive dental visit is recommended every 6 months.  Try to get at least 150 minutes of exercise per week or 10,000 steps per day on a pedometer .  Order or download the FREE \"Exercise & Physical Activity: Your Everyday Guide\" from [employer]. Call 1-568.154.6696 or search [employer] online.  You need 4333-9916 mg of calcium and 6133-6095 IU of vitamin D per day. It is possible to meet your calcium requirement with diet alone, but a vitamin D supplement is usually necessary to meet this goal.  When exposed to the sun, use a sunscreen that protects against both UVA and UVB radiation with an SPF of 30 or greater. Reapply every 2 to 3 hours or after sweating, drying off with a towel, or swimming.  Always wear a seat belt when traveling in a car. Always wear a helmet when riding a bicycle or motorcycle.

## 2024-08-22 NOTE — PROGRESS NOTES
Medicare Annual Wellness Visit    Hilda Farley is here for Medicare AWV    Assessment & Plan   Initial Medicare annual wellness visit  Recommendations for Preventive Services Due: see orders and patient instructions/AVS.  Recommended screening schedule for the next 5-10 years is provided to the patient in written form: see Patient Instructions/AVS.     No follow-ups on file.     Subjective       Patient's complete Health Risk Assessment and screening values have been reviewed and are found in Flowsheets. The following problems were reviewed today and where indicated follow up appointments were made and/or referrals ordered.    Positive Risk Factor Screenings with Interventions:    Fall Risk:  Do you feel unsteady or are you worried about falling? : (!) yes (sometimes gets dizzy)  2 or more falls in past year?: no  Fall with injury in past year?: no     Interventions:    Reviewed medications, home hazards, visual acuity, and co-morbidities that can increase risk for falls  See AVS for additional education material              Poor Eating Habits/Diet:  Do you eat balanced/healthy meals regularly?: (!) No  Interventions:  See AVS for additional education material    Abnormal BMI (obese):  There is no height or weight on file to calculate BMI. (!) Abnormal  Interventions:  See AVS for additional education material          Vision Screen:  Do you have difficulty driving, watching TV, or doing any of your daily activities because of your eyesight?: No  Have you had an eye exam within the past year?: (!) No  Interventions:   See AVS for additional education material                    Objective   Vitals:    08/22/24 1030   BP: 116/70   Pulse: 85   Resp: 16   Weight: 83 kg (183 lb)   Height: 1.575 m (5' 2\")      Body mass index is 33.47 kg/m².                    Allergies   Allergen Reactions    Latex     Amlodipine      Red rash all over      Bactrim     Benazepril Hcl     Ciprofloxacin      DIZZY    Clonidine

## 2024-11-05 DIAGNOSIS — I10 ESSENTIAL HYPERTENSION: ICD-10-CM

## 2024-11-05 DIAGNOSIS — I47.10 PAROXYSMAL SUPRAVENTRICULAR TACHYCARDIA (HCC): ICD-10-CM

## 2024-11-06 DIAGNOSIS — E78.49 OTHER HYPERLIPIDEMIA: Chronic | ICD-10-CM

## 2024-11-06 RX ORDER — METOPROLOL TARTRATE 50 MG
TABLET ORAL
Qty: 180 TABLET | Refills: 3 | Status: SHIPPED | OUTPATIENT
Start: 2024-11-06

## 2024-11-06 RX ORDER — PRAVASTATIN SODIUM 40 MG
TABLET ORAL
Qty: 90 TABLET | Refills: 3 | Status: SHIPPED | OUTPATIENT
Start: 2024-11-06

## 2024-11-06 NOTE — TELEPHONE ENCOUNTER
Refill Request     CONFIRM preferred pharmacy with the patient.    If Mail Order Rx - Pend for 90 day refill.      Last Seen: Last Seen Department: 8/22/2024  Last Seen by PCP: 8/22/2024    Last Written: 12/21/23 90 with 3 refills     If no future appointment scheduled:  Review the last OV with PCP and review information for follow-up visit,  Route STAFF MESSAGE with patient name to the  Pool for scheduling with the following information:            -  Timing of next visit           -  Visit type ie Physical, OV, etc           -  Diagnoses/Reason ie. COPD, HTN - Do not use MEDICATION, Follow-up or CHECK UP - Give reason for visit      Next Appointment:   Future Appointments   Date Time Provider Department Center   2/24/2025 10:30 AM Chris Michele, DO [facility]       Message sent to  to schedule appt with patient?  NO      Requested Prescriptions     Pending Prescriptions Disp Refills    pravastatin (PRAVACHOL) 40 MG tablet [Pharmacy Med Name: Pravastatin Sodium 40 MG Oral Tablet] 90 tablet 3     Sig: TAKE 1 TABLET BY MOUTH DAILY

## 2024-11-06 NOTE — TELEPHONE ENCOUNTER
Refill Request     CONFIRM preferred pharmacy with the patient.    If Mail Order Rx - Pend for 90 day refill.      Last Seen: Last Seen Department: 8/22/2024  Last Seen by PCP: 8/22/2024    Last Written: 12/21/23 180 with 3 refills     If no future appointment scheduled:  Review the last OV with PCP and review information for follow-up visit,  Route STAFF MESSAGE with patient name to the  Pool for scheduling with the following information:            -  Timing of next visit           -  Visit type ie Physical, OV, etc           -  Diagnoses/Reason ie. COPD, HTN - Do not use MEDICATION, Follow-up or CHECK UP - Give reason for visit      Next Appointment:   Future Appointments   Date Time Provider Department Center   2/24/2025 10:30 AM Chris Michele, DO [facility] DEP       Message sent to  to schedule appt with patient?  NO      Requested Prescriptions     Pending Prescriptions Disp Refills    metoprolol tartrate (LOPRESSOR) 50 MG tablet [Pharmacy Med Name: Metoprolol Tartrate 50 MG Oral Tablet] 180 tablet 3     Sig: TAKE 1 TABLET BY MOUTH TWICE  DAILY

## 2025-02-24 ENCOUNTER — OFFICE VISIT (OUTPATIENT)
Dept: FAMILY MEDICINE CLINIC | Age: 80
End: 2025-02-24

## 2025-02-24 VITALS
SYSTOLIC BLOOD PRESSURE: 138 MMHG | HEIGHT: 62 IN | HEART RATE: 93 BPM | BODY MASS INDEX: 34.78 KG/M2 | OXYGEN SATURATION: 98 % | DIASTOLIC BLOOD PRESSURE: 84 MMHG | WEIGHT: 189 LBS

## 2025-02-24 DIAGNOSIS — I15.2 HYPERTENSION ASSOCIATED WITH TYPE 2 DIABETES MELLITUS (HCC): ICD-10-CM

## 2025-02-24 DIAGNOSIS — E11.59 HYPERTENSION ASSOCIATED WITH TYPE 2 DIABETES MELLITUS (HCC): ICD-10-CM

## 2025-02-24 DIAGNOSIS — E11.22 TYPE 2 DIABETES MELLITUS WITH STAGE 3A CHRONIC KIDNEY DISEASE, WITHOUT LONG-TERM CURRENT USE OF INSULIN (HCC): ICD-10-CM

## 2025-02-24 DIAGNOSIS — N18.31 TYPE 2 DIABETES MELLITUS WITH STAGE 3A CHRONIC KIDNEY DISEASE, WITHOUT LONG-TERM CURRENT USE OF INSULIN (HCC): ICD-10-CM

## 2025-02-24 DIAGNOSIS — E78.5 HYPERLIPIDEMIA ASSOCIATED WITH TYPE 2 DIABETES MELLITUS (HCC): Primary | ICD-10-CM

## 2025-02-24 DIAGNOSIS — E11.69 HYPERLIPIDEMIA ASSOCIATED WITH TYPE 2 DIABETES MELLITUS (HCC): Primary | ICD-10-CM

## 2025-02-24 LAB — HBA1C MFR BLD: 6.4 %

## 2025-02-24 SDOH — ECONOMIC STABILITY: FOOD INSECURITY: WITHIN THE PAST 12 MONTHS, YOU WORRIED THAT YOUR FOOD WOULD RUN OUT BEFORE YOU GOT MONEY TO BUY MORE.: NEVER TRUE

## 2025-02-24 SDOH — ECONOMIC STABILITY: FOOD INSECURITY: WITHIN THE PAST 12 MONTHS, THE FOOD YOU BOUGHT JUST DIDN'T LAST AND YOU DIDN'T HAVE MONEY TO GET MORE.: NEVER TRUE

## 2025-02-24 ASSESSMENT — PATIENT HEALTH QUESTIONNAIRE - PHQ9
SUM OF ALL RESPONSES TO PHQ QUESTIONS 1-9: 0
SUM OF ALL RESPONSES TO PHQ QUESTIONS 1-9: 0
SUM OF ALL RESPONSES TO PHQ9 QUESTIONS 1 & 2: 0
SUM OF ALL RESPONSES TO PHQ QUESTIONS 1-9: 0
1. LITTLE INTEREST OR PLEASURE IN DOING THINGS: NOT AT ALL
SUM OF ALL RESPONSES TO PHQ QUESTIONS 1-9: 0
2. FEELING DOWN, DEPRESSED OR HOPELESS: NOT AT ALL

## 2025-02-24 NOTE — PROGRESS NOTES
Assessment:  Encounter Diagnoses   Name Primary?    Hyperlipidemia associated with type 2 diabetes mellitus (HCC) Yes    Type 2 diabetes mellitus with stage 3a chronic kidney disease, without long-term current use of insulin (HCC)     Hypertension associated with type 2 diabetes mellitus (HCC)        Assessment & Plan  1. Weight management  - Weight gain of 6 pounds since August 2024, current weight 189 lbs  - Advised to monitor weight  - Incorporate more physical activity, such as consistent walking for 15-20 minutes    2. Diabetes Mellitus  - Blood glucose stable at 6.4 despite weight gain  - Continue current medication regimen and lifestyle modifications    3. Urinary urgency  - Reports urinary urgency, possibly due to weakened pelvic floor from five pregnancies  - Discussed pelvic floor physical therapy  - Urine test for proteinuria today    4. Health maintenance  - Advised to consider influenza vaccine in October or November 2025 due to age and diabetic condition    Follow-up  - Follow up in 6 months    Plan:  1. Hyperlipidemia associated with type 2 diabetes mellitus (HCC)  -     POCT glycosylated hemoglobin (Hb A1C)  2. Type 2 diabetes mellitus with stage 3a chronic kidney disease, without long-term current use of insulin (HCC)  -     POCT glycosylated hemoglobin (Hb A1C)  -     Albumin/Creatinine Ratio, Urine  3. Hypertension associated with type 2 diabetes mellitus (HCC)       No follow-ups on file.      Patient: Hilda Farley is a 79 y.o. female who presents today with the following Chief Complaint(s):  Chief Complaint   Patient presents with    Diabetes         HPI  History of Present Illness  The patient presents for a 6-month follow-up.    Weight Gain  - She reports slight weight gain due to increased food intake during the holiday season.    Active Lifestyle  - She maintains an active lifestyle with gardening and frequent walking, spending 3-4 hours walking around stores.  - She resides in a

## 2025-02-25 LAB
CREAT UR-MCNC: 78.3 MG/DL (ref 28–259)
MICROALBUMIN UR DL<=1MG/L-MCNC: 4.94 MG/DL
MICROALBUMIN/CREAT UR: 63.1 MG/G (ref 0–30)

## 2025-06-06 ENCOUNTER — PATIENT MESSAGE (OUTPATIENT)
Dept: FAMILY MEDICINE CLINIC | Age: 80
End: 2025-06-06